# Patient Record
Sex: MALE | Race: WHITE | NOT HISPANIC OR LATINO | URBAN - METROPOLITAN AREA
[De-identification: names, ages, dates, MRNs, and addresses within clinical notes are randomized per-mention and may not be internally consistent; named-entity substitution may affect disease eponyms.]

---

## 2017-08-11 ENCOUNTER — HISTORICAL (OUTPATIENT)
Dept: ADMINISTRATIVE | Facility: HOSPITAL | Age: 47
End: 2017-08-11

## 2017-08-13 LAB — FINAL CULTURE: NORMAL

## 2017-08-18 LAB
FINAL CULTURE: NORMAL
FINAL CULTURE: NORMAL

## 2021-06-08 ENCOUNTER — HISTORICAL (OUTPATIENT)
Dept: ADMINISTRATIVE | Facility: HOSPITAL | Age: 51
End: 2021-06-08

## 2021-06-10 LAB — FINAL CULTURE: NORMAL

## 2021-10-18 ENCOUNTER — HISTORICAL (OUTPATIENT)
Dept: ADMINISTRATIVE | Facility: HOSPITAL | Age: 51
End: 2021-10-18

## 2021-10-18 LAB — POC CREATININE: 1.1 MG/DL (ref 0.6–1.3)

## 2022-05-20 DIAGNOSIS — K21.9 ESOPHAGEAL REFLUX: Primary | ICD-10-CM

## 2022-07-15 DIAGNOSIS — K21.9 ESOPHAGEAL REFLUX: Primary | ICD-10-CM

## 2022-07-21 ENCOUNTER — ANESTHESIA EVENT (OUTPATIENT)
Dept: ENDOSCOPY | Facility: HOSPITAL | Age: 52
End: 2022-07-21
Payer: COMMERCIAL

## 2022-07-22 RX ORDER — FAMOTIDINE 40 MG/1
40 TABLET, FILM COATED ORAL DAILY
COMMUNITY

## 2022-07-22 RX ORDER — PANTOPRAZOLE SODIUM 40 MG/1
40 TABLET, DELAYED RELEASE ORAL DAILY
COMMUNITY

## 2022-07-22 RX ORDER — ESOMEPRAZOLE MAGNESIUM 10 MG/1
10 GRANULE, FOR SUSPENSION, EXTENDED RELEASE ORAL
COMMUNITY

## 2022-07-22 RX ORDER — GLYCOPYRROLATE 1 MG/1
1 TABLET ORAL DAILY PRN
COMMUNITY

## 2022-07-22 RX ORDER — SILDENAFIL CITRATE 20 MG/1
20 TABLET ORAL DAILY PRN
COMMUNITY

## 2022-07-25 ENCOUNTER — ANESTHESIA (OUTPATIENT)
Dept: ENDOSCOPY | Facility: HOSPITAL | Age: 52
End: 2022-07-25
Payer: COMMERCIAL

## 2022-07-25 ENCOUNTER — HOSPITAL ENCOUNTER (OUTPATIENT)
Facility: HOSPITAL | Age: 52
Discharge: HOME | End: 2022-07-25
Attending: INTERNAL MEDICINE | Admitting: INTERNAL MEDICINE
Payer: COMMERCIAL

## 2022-07-25 VITALS
BODY MASS INDEX: 29.62 KG/M2 | SYSTOLIC BLOOD PRESSURE: 122 MMHG | DIASTOLIC BLOOD PRESSURE: 78 MMHG | OXYGEN SATURATION: 95 % | HEIGHT: 69 IN | HEART RATE: 66 BPM | WEIGHT: 200 LBS | RESPIRATION RATE: 24 BRPM

## 2022-07-25 DIAGNOSIS — K21.9 GASTROESOPHAGEAL REFLUX DISEASE WITHOUT ESOPHAGITIS: Primary | ICD-10-CM

## 2022-07-25 DIAGNOSIS — K21.9 NONEROSIVE ESOPHAGEAL REFLUX DISEASE: ICD-10-CM

## 2022-07-25 DIAGNOSIS — K21.9 GASTROESOPHAGEAL REFLUX DISEASE, UNSPECIFIED WHETHER ESOPHAGITIS PRESENT: ICD-10-CM

## 2022-07-25 DIAGNOSIS — K58.9 IRRITABLE BOWEL SYNDROME, UNSPECIFIED TYPE: ICD-10-CM

## 2022-07-25 PROCEDURE — 37000008 HC ANESTHESIA 1ST 15 MINUTES: Performed by: INTERNAL MEDICINE

## 2022-07-25 PROCEDURE — 27200942: Performed by: INTERNAL MEDICINE

## 2022-07-25 PROCEDURE — 43450 DILATE ESOPHAGUS 1/MULT PASS: CPT | Performed by: INTERNAL MEDICINE

## 2022-07-25 PROCEDURE — 25000003 PHARM REV CODE 250: Performed by: NURSE ANESTHETIST, CERTIFIED REGISTERED

## 2022-07-25 PROCEDURE — 37000009 HC ANESTHESIA EA ADD 15 MINS: Performed by: INTERNAL MEDICINE

## 2022-07-25 PROCEDURE — 27201423 OPTIME MED/SURG SUP & DEVICES STERILE SUPPLY: Performed by: INTERNAL MEDICINE

## 2022-07-25 PROCEDURE — 43239 EGD BIOPSY SINGLE/MULTIPLE: CPT | Mod: 59 | Performed by: INTERNAL MEDICINE

## 2022-07-25 PROCEDURE — 63600175 PHARM REV CODE 636 W HCPCS: Performed by: NURSE ANESTHETIST, CERTIFIED REGISTERED

## 2022-07-25 RX ORDER — LIDOCAINE HYDROCHLORIDE 20 MG/ML
INJECTION, SOLUTION EPIDURAL; INFILTRATION; INTRACAUDAL; PERINEURAL
Status: COMPLETED
Start: 2022-07-25 | End: 2022-07-25

## 2022-07-25 RX ORDER — LIDOCAINE HYDROCHLORIDE 20 MG/ML
INJECTION, SOLUTION EPIDURAL; INFILTRATION; INTRACAUDAL; PERINEURAL
Status: DISCONTINUED
Start: 2022-07-25 | End: 2022-07-25 | Stop reason: HOSPADM

## 2022-07-25 RX ORDER — KETAMINE HYDROCHLORIDE 100 MG/ML
INJECTION, SOLUTION INTRAMUSCULAR; INTRAVENOUS
Status: DISCONTINUED | OUTPATIENT
Start: 2022-07-25 | End: 2022-07-25

## 2022-07-25 RX ORDER — SODIUM CHLORIDE 0.9 % (FLUSH) 0.9 %
10 SYRINGE (ML) INJECTION
Status: DISCONTINUED | OUTPATIENT
Start: 2022-07-25 | End: 2022-07-25 | Stop reason: HOSPADM

## 2022-07-25 RX ORDER — KETAMINE HYDROCHLORIDE 100 MG/ML
INJECTION, SOLUTION INTRAMUSCULAR; INTRAVENOUS
Status: COMPLETED
Start: 2022-07-25 | End: 2022-07-25

## 2022-07-25 RX ORDER — PROPOFOL 10 MG/ML
VIAL (ML) INTRAVENOUS
Status: DISCONTINUED | OUTPATIENT
Start: 2022-07-25 | End: 2022-07-25

## 2022-07-25 RX ORDER — PROPOFOL 10 MG/ML
VIAL (ML) INTRAVENOUS CONTINUOUS PRN
Status: DISCONTINUED | OUTPATIENT
Start: 2022-07-25 | End: 2022-07-25

## 2022-07-25 RX ORDER — LIDOCAINE HYDROCHLORIDE 20 MG/ML
INJECTION, SOLUTION EPIDURAL; INFILTRATION; INTRACAUDAL; PERINEURAL
Status: DISCONTINUED | OUTPATIENT
Start: 2022-07-25 | End: 2022-07-25

## 2022-07-25 RX ORDER — GLYCOPYRROLATE 0.2 MG/ML
INJECTION INTRAMUSCULAR; INTRAVENOUS
Status: COMPLETED
Start: 2022-07-25 | End: 2022-07-25

## 2022-07-25 RX ORDER — PROPOFOL 10 MG/ML
VIAL (ML) INTRAVENOUS
Status: COMPLETED
Start: 2022-07-25 | End: 2022-07-25

## 2022-07-25 RX ORDER — LIDOCAINE HYDROCHLORIDE 10 MG/ML
1 INJECTION, SOLUTION EPIDURAL; INFILTRATION; INTRACAUDAL; PERINEURAL ONCE
Status: DISCONTINUED | OUTPATIENT
Start: 2022-07-25 | End: 2022-07-25 | Stop reason: HOSPADM

## 2022-07-25 RX ORDER — SODIUM CHLORIDE, SODIUM GLUCONATE, SODIUM ACETATE, POTASSIUM CHLORIDE AND MAGNESIUM CHLORIDE 30; 37; 368; 526; 502 MG/100ML; MG/100ML; MG/100ML; MG/100ML; MG/100ML
1000 INJECTION, SOLUTION INTRAVENOUS CONTINUOUS
Status: DISCONTINUED | OUTPATIENT
Start: 2022-07-25 | End: 2022-07-25 | Stop reason: HOSPADM

## 2022-07-25 RX ORDER — PHENYLEPHRINE HYDROCHLORIDE 10 MG/ML
INJECTION INTRAVENOUS
Status: DISCONTINUED | OUTPATIENT
Start: 2022-07-25 | End: 2022-07-25

## 2022-07-25 RX ADMIN — SODIUM CHLORIDE, SODIUM GLUCONATE, SODIUM ACETATE, POTASSIUM CHLORIDE AND MAGNESIUM CHLORIDE: 526; 502; 368; 37; 30 INJECTION, SOLUTION INTRAVENOUS at 07:07

## 2022-07-25 RX ADMIN — PHENYLEPHRINE HYDROCHLORIDE 100 MCG: 10 INJECTION INTRAVENOUS at 08:07

## 2022-07-25 RX ADMIN — GLYCOPYRROLATE 0.2 MG: 0.2 INJECTION, SOLUTION INTRAMUSCULAR; INTRAVENOUS at 07:07

## 2022-07-25 RX ADMIN — PROPOFOL 50 MG: 10 INJECTION, EMULSION INTRAVENOUS at 08:07

## 2022-07-25 RX ADMIN — LIDOCAINE HYDROCHLORIDE 4 ML: 20 INJECTION, SOLUTION INTRAVENOUS at 07:07

## 2022-07-25 RX ADMIN — PROPOFOL 50 MG: 10 INJECTION, EMULSION INTRAVENOUS at 07:07

## 2022-07-25 RX ADMIN — KETAMINE HYDROCHLORIDE 20 MG: 100 INJECTION, SOLUTION, CONCENTRATE INTRAMUSCULAR; INTRAVENOUS at 07:07

## 2022-07-25 NOTE — ANESTHESIA POSTPROCEDURE EVALUATION
Anesthesia Post Evaluation    Patient: Chava Bah    Procedure(s) Performed: Procedure(s) (LRB):  EGD w/ BRAVO PLACEMENT 48 HR (N/A)  BRAVO (N/A)  ESOPHAGOSCOPY, USING BOUGIE, WITH DILATION (N/A)  EGD, WITH CLOSED BIOPSY (N/A)    Final Anesthesia Type: MAC      Patient location during evaluation: PACU  Patient participation: Yes- Able to Participate  Level of consciousness: awake and alert  Post-procedure vital signs: reviewed and stable  Pain management: adequate  Airway patency: patent    PONV status at discharge: No PONV  Anesthetic complications: no      Cardiovascular status: blood pressure returned to baseline  Respiratory status: spontaneous ventilation and unassisted  Hydration status: euvolemic  Follow-up needed           Vitals Value Taken Time   /78 07/25/22 0840   Temp 98 07/25/22 1342   Pulse 66 07/25/22 0840   Resp 24 07/25/22 0840   SpO2 95 % 07/25/22 0840         No case tracking events are documented in the log.      Pain/Tyree Score: Tyree Score: 10 (7/25/2022  8:40 AM)

## 2022-07-25 NOTE — PROVATION PATIENT INSTRUCTIONS
Discharge Summary/Instructions after an Endoscopic Procedure  Patient Name: Chava Bah  Patient MRN: [id number]  Patient YOB: 1970 Monday, July 25, 2022  BRENDA Schreiber MD  Dear patient,  As a result of recent federal legislation (The Federal Cures Act), you may   receive lab or pathology results from your procedure in your [facility]   account before your physician is able to contact you. Your physician or   their representative will relay the results to you with their   recommendations at their soonest availability.  Thank you,  RESTRICTIONS:  During your procedure today, you received medications for sedation.  These   medications may affect your judgment, balance and coordination.  Therefore,   for 24 hours, you have the following restrictions:   - DO NOT drive a car, operate machinery, make legal/financial decisions,   sign important papers or drink alcohol.    ACTIVITY:  Today: no heavy lifting, straining or running due to procedural   sedation/anesthesia.  The following day: return to full activity including work.  DIET:  Eat and drink normally unless instructed otherwise.     TREATMENT FOR COMMON SIDE EFFECTS:  - Mild abdominal pain, nausea, belching, bloating or excessive gas:  rest,   eat lightly and use a heating pad.  - Sore Throat: treat with throat lozenges and/or gargle with warm salt   water.  - Because air was used during the procedure, expelling large amounts of air   from your rectum or belching is normal.  - If a bowel prep was taken, you may not have a bowel movement for 1-3 days.    This is normal.  SYMPTOMS TO WATCH FOR AND REPORT TO YOUR PHYSICIAN:  1. Abdominal pain or bloating, other than gas cramps.  2. Chest pain.  3. Back pain.  4. Signs of infection such as: chills or fever occurring within 24 hours   after the procedure.  5. Rectal bleeding, which would show as bright red, maroon, or black stools.   (A tablespoon of blood from the rectum is not serious, especially  if   hemorrhoids are present.)  6. Vomiting.  7. Weakness or dizziness.  GO DIRECTLY TO THE NEAREST EMERGENCY ROOM IF YOU HAVE ANY OF THE FOLLOWING:      Difficulty breathing              Chills and/or fever over 101 F   Persistent vomiting and/or vomiting blood   Severe abdominal pain   Severe chest pain   Black, tarry stools   Bleeding- more than one tablespoon   Any other symptom or condition that you feel may need urgent attention  Your doctor recommends these additional instructions:  If any biopsies were taken, your doctors clinic will contact you in 1 to 2   weeks with any results.  - Patient has a contact number available for emergencies.  The signs and   symptoms of potential delayed complications were discussed with the   patient.  Return to normal activities tomorrow.  Written discharge   instructions were provided to the patient.   - Written discharge instructions were provided to the patient.   - The signs and symptoms of potential delayed complications were discussed   with the patient.   - Patient has a contact number available for emergencies.   - Return to normal activities tomorrow.   - Resume previous diet.  For questions, problems or results please call your physician - BRENDA Schreiber MD at Work:  [phone].  [facility] [place], EMERGENCY ROOM PHONE NUMBER: [phone]  IF A COMPLICATION OR EMERGENCY SITUATION ARISES AND YOU ARE UNABLE TO REACH   YOUR PHYSICIAN - GO DIRECTLY TO THE EMERGENCY ROOM.  MD BRENDA Kirkland MD  7/25/2022 8:19:39 AM  This report has been verified and signed electronically.  Dear patient,  As a result of recent federal legislation (The Federal Cures Act), you may   receive lab or pathology results from your procedure in your [facility]   account before your physician is able to contact you. Your physician or   their representative will relay the results to you with their   recommendations at their soonest  availability.  Thank you,  PROVATION

## 2022-07-25 NOTE — INTERVAL H&P NOTE
The patient has been examined and the H&P has been reviewed. No changes in past 30 days.  risks, benefits and alternative options discussed and understood by patient/family.          There are no hospital problems to display for this patient.

## 2022-07-25 NOTE — TRANSFER OF CARE
"Anesthesia Transfer of Care Note    Patient: Chava Bah    Procedure(s) Performed: Procedure(s) (LRB):  EGD w/ BRAVO PLACEMENT 48 HR (N/A)  BRAVO (N/A)  ESOPHAGOSCOPY, USING BOUGIE, WITH DILATION (N/A)  EGD, WITH CLOSED BIOPSY (N/A)    Patient location: GI    Anesthesia Type: MAC    Transport from OR: Transported from OR on room air with adequate spontaneous ventilation    Post pain: adequate analgesia    Post assessment: no apparent anesthetic complications and tolerated procedure well    Post vital signs: stable    Level of consciousness: awake and alert    Nausea/Vomiting: no nausea/vomiting    Complications: none    Transfer of care protocol was followed      Last vitals:   Visit Vitals  Ht 5' 9" (1.753 m)   Wt 90.7 kg (200 lb)   BMI 29.53 kg/m²     "

## 2022-07-27 LAB
ESTROGEN SERPL-MCNC: NORMAL PG/ML
INSULIN SERPL-ACNC: NORMAL U[IU]/ML
LAB AP CLINICAL INFORMATION: NORMAL
LAB AP GROSS DESCRIPTION: NORMAL
LAB AP REPORT FOOTNOTES: NORMAL
T3RU NFR SERPL: NORMAL %

## 2023-04-18 ENCOUNTER — APPOINTMENT (RX ONLY)
Dept: URBAN - METROPOLITAN AREA CLINIC 110 | Facility: CLINIC | Age: 53
Setting detail: DERMATOLOGY
End: 2023-04-18

## 2023-04-18 VITALS — HEIGHT: 69 IN | WEIGHT: 230 LBS

## 2023-04-18 DIAGNOSIS — D18.0 HEMANGIOMA: ICD-10-CM

## 2023-04-18 DIAGNOSIS — L81.4 OTHER MELANIN HYPERPIGMENTATION: ICD-10-CM

## 2023-04-18 DIAGNOSIS — Z71.89 OTHER SPECIFIED COUNSELING: ICD-10-CM

## 2023-04-18 DIAGNOSIS — L82.0 INFLAMED SEBORRHEIC KERATOSIS: ICD-10-CM

## 2023-04-18 DIAGNOSIS — L57.8 OTHER SKIN CHANGES DUE TO CHRONIC EXPOSURE TO NONIONIZING RADIATION: ICD-10-CM

## 2023-04-18 DIAGNOSIS — D22 MELANOCYTIC NEVI: ICD-10-CM

## 2023-04-18 DIAGNOSIS — L82.1 OTHER SEBORRHEIC KERATOSIS: ICD-10-CM

## 2023-04-18 PROBLEM — D18.01 HEMANGIOMA OF SKIN AND SUBCUTANEOUS TISSUE: Status: ACTIVE | Noted: 2023-04-18

## 2023-04-18 PROBLEM — D22.5 MELANOCYTIC NEVI OF TRUNK: Status: ACTIVE | Noted: 2023-04-18

## 2023-04-18 PROCEDURE — 99213 OFFICE O/P EST LOW 20 MIN: CPT | Mod: 25

## 2023-04-18 PROCEDURE — 17110 DESTRUCTION B9 LES UP TO 14: CPT

## 2023-04-18 PROCEDURE — ? COUNSELING

## 2023-04-18 PROCEDURE — ? LIQUID NITROGEN

## 2023-04-18 ASSESSMENT — LOCATION SIMPLE DESCRIPTION DERM
LOCATION SIMPLE: CHEST
LOCATION SIMPLE: LEFT UPPER BACK
LOCATION SIMPLE: RIGHT UPPER BACK

## 2023-04-18 ASSESSMENT — LOCATION DETAILED DESCRIPTION DERM
LOCATION DETAILED: RIGHT MID-UPPER BACK
LOCATION DETAILED: LEFT SUPERIOR MEDIAL UPPER BACK
LOCATION DETAILED: LEFT SUPERIOR UPPER BACK
LOCATION DETAILED: RIGHT MEDIAL INFERIOR CHEST
LOCATION DETAILED: STERNUM
LOCATION DETAILED: RIGHT INFERIOR UPPER BACK
LOCATION DETAILED: RIGHT MEDIAL SUPERIOR CHEST
LOCATION DETAILED: LEFT MEDIAL SUPERIOR CHEST
LOCATION DETAILED: LEFT MEDIAL UPPER BACK

## 2023-04-18 ASSESSMENT — LOCATION ZONE DERM: LOCATION ZONE: TRUNK

## 2023-04-18 NOTE — PROCEDURE: LIQUID NITROGEN
Add 52 Modifier (Optional): no
Show Aperture Variable?: Yes
Spray Paint Text: The liquid nitrogen was applied to the skin utilizing a spray paint frosting technique.
Medical Necessity Clause: This procedure was medically necessary because the lesions that were treated were:
Post-Care Instructions: I reviewed with the patient in detail post-care instructions. Patient is to wear sunprotection, and avoid picking at any of the treated lesions. Pt may apply Vaseline to crusted or scabbing areas.
Medical Necessity Information: It is in your best interest to select a reason for this procedure from the list below. All of these items fulfill various CMS LCD requirements except the new and changing color options.
Consent: The patient's consent was obtained including but not limited to risks of crusting, scabbing, blistering, scarring, darker or lighter pigmentary change, recurrence, incomplete removal and infection.
Detail Level: Detailed

## 2023-04-28 ENCOUNTER — HOSPITAL ENCOUNTER (OUTPATIENT)
Dept: RADIOLOGY | Facility: CLINIC | Age: 53
Discharge: HOME | End: 2023-04-28
Attending: PHYSICIAN ASSISTANT
Payer: COMMERCIAL

## 2023-04-28 ENCOUNTER — OFFICE VISIT (OUTPATIENT)
Dept: ORTHOPEDICS | Facility: CLINIC | Age: 53
End: 2023-04-28
Payer: COMMERCIAL

## 2023-04-28 VITALS
DIASTOLIC BLOOD PRESSURE: 87 MMHG | WEIGHT: 187 LBS | BODY MASS INDEX: 27.7 KG/M2 | HEIGHT: 69 IN | HEART RATE: 75 BPM | SYSTOLIC BLOOD PRESSURE: 143 MMHG

## 2023-04-28 DIAGNOSIS — M25.512 LEFT SHOULDER PAIN, UNSPECIFIED CHRONICITY: ICD-10-CM

## 2023-04-28 DIAGNOSIS — M75.82 ROTATOR CUFF TENDONITIS, LEFT: ICD-10-CM

## 2023-04-28 DIAGNOSIS — M19.012 OSTEOARTHRITIS OF LEFT AC (ACROMIOCLAVICULAR) JOINT: ICD-10-CM

## 2023-04-28 DIAGNOSIS — M25.512 LEFT SHOULDER PAIN, UNSPECIFIED CHRONICITY: Primary | ICD-10-CM

## 2023-04-28 PROCEDURE — 3008F PR BODY MASS INDEX (BMI) DOCUMENTED: ICD-10-PCS | Mod: CPTII,,, | Performed by: PHYSICIAN ASSISTANT

## 2023-04-28 PROCEDURE — 3079F DIAST BP 80-89 MM HG: CPT | Mod: CPTII,,, | Performed by: PHYSICIAN ASSISTANT

## 2023-04-28 PROCEDURE — 73030 X-RAY EXAM OF SHOULDER: CPT | Mod: LT,,, | Performed by: PHYSICIAN ASSISTANT

## 2023-04-28 PROCEDURE — 3079F PR MOST RECENT DIASTOLIC BLOOD PRESSURE 80-89 MM HG: ICD-10-PCS | Mod: CPTII,,, | Performed by: PHYSICIAN ASSISTANT

## 2023-04-28 PROCEDURE — 3077F SYST BP >= 140 MM HG: CPT | Mod: CPTII,,, | Performed by: PHYSICIAN ASSISTANT

## 2023-04-28 PROCEDURE — 1160F PR REVIEW ALL MEDS BY PRESCRIBER/CLIN PHARMACIST DOCUMENTED: ICD-10-PCS | Mod: CPTII,,, | Performed by: PHYSICIAN ASSISTANT

## 2023-04-28 PROCEDURE — 3008F BODY MASS INDEX DOCD: CPT | Mod: CPTII,,, | Performed by: PHYSICIAN ASSISTANT

## 2023-04-28 PROCEDURE — 1160F RVW MEDS BY RX/DR IN RCRD: CPT | Mod: CPTII,,, | Performed by: PHYSICIAN ASSISTANT

## 2023-04-28 PROCEDURE — 73030 XR SHOULDER COMPLETE 2 OR MORE VIEWS LEFT: ICD-10-PCS | Mod: LT,,, | Performed by: PHYSICIAN ASSISTANT

## 2023-04-28 PROCEDURE — 99202 PR OFFICE/OUTPT VISIT, NEW, LEVL II, 15-29 MIN: ICD-10-PCS | Mod: ,,, | Performed by: PHYSICIAN ASSISTANT

## 2023-04-28 PROCEDURE — 3077F PR MOST RECENT SYSTOLIC BLOOD PRESSURE >= 140 MM HG: ICD-10-PCS | Mod: CPTII,,, | Performed by: PHYSICIAN ASSISTANT

## 2023-04-28 PROCEDURE — 1159F MED LIST DOCD IN RCRD: CPT | Mod: CPTII,,, | Performed by: PHYSICIAN ASSISTANT

## 2023-04-28 PROCEDURE — 99202 OFFICE O/P NEW SF 15 MIN: CPT | Mod: ,,, | Performed by: PHYSICIAN ASSISTANT

## 2023-04-28 PROCEDURE — 1159F PR MEDICATION LIST DOCUMENTED IN MEDICAL RECORD: ICD-10-PCS | Mod: CPTII,,, | Performed by: PHYSICIAN ASSISTANT

## 2023-04-28 NOTE — PROGRESS NOTES
Chief Complaint:   Chief Complaint   Patient presents with    Left Shoulder - Pain    Shoulder Pain     Pt states he was trying to take his shirt off and heard a click/crack sound in his shoulder. Pt states the pain has gotten better, but he would like to check what happened. Pt wanted to report that he did undergo through a partial nephrectomy and can't take NSAIDs.       History of present illness:    53-year-old right-hand dominant male presents office today for evaluation of his left shoulder pain and popping.  This has been present for couple weeks with no associated injury.  He was taking off his shirt and noted some popping sensations to the left shoulder with some resultant lateral-sided pain.  The pain has gotten better since he made the appointment.  He is also recovering from shingles 3 weeks ago and currently still on Valtrex.  He just wanted to have this evaluated and make sure that the popping sensations were not abnormal    Past Medical History:   Diagnosis Date    Bone spur neck     Congenital absence of one kidney     E. coli after prostate bx, currently on abx     GERD (gastroesophageal reflux disease)     History of chicken pox     Irregular heart beat     Irritable bowel syndrome without diarrhea     Kidney stones     Nonerosive esophageal reflux disease     Personal history of colonic polyps     Personal history of colonic polyps     Prostatitis     Swallowing difficulty        Past Surgical History:   Procedure Laterality Date    abdominal ultrasound      COLONOSCOPY  04/20/2022    COLONOSCOPY  03/30/2017    ESOPHAGOGASTRODUODENOSCOPY      2021, 02/25/21, 12/29/17,    ESOPHAGOGASTRODUODENOSCOPY N/A 07/25/2022    Procedure: EGD w/ BRAVO PLACEMENT 48 HR;  Surgeon: SHELTON Schreiber MD;  Location: [facility] ENDOSCOPY;  Service: Gastroenterology;  Laterality: N/A;  Pt advised by office to hold PPI x 3 days before procedure.    INSERTION OF PH PROBE N/A 07/25/2022    Procedure: BRAVO;   "Surgeon: SHELTON Schreiber MD;  Location: [facility] ENDOSCOPY;  Service: Gastroenterology;  Laterality: N/A;    LITHOTRIPSY      x2    PARTIAL NEPHRECTOMY      PROSTATE BIOPSY      x2       Current Outpatient Medications   Medication Sig    ergocalciferol, vitamin D2, (VITAMIN D ORAL) Take by mouth.    esomeprazole magnesium (NEXIUM) 10 mg suspension Take 10 mg by mouth before breakfast.    famotidine (PEPCID) 40 MG tablet Take 40 mg by mouth once daily.    glycopyrrolate (ROBINUL) 1 mg Tab Take 1 mg by mouth daily as needed.    pantoprazole (PROTONIX) 40 MG tablet Take 40 mg by mouth once daily.    sildenafil (REVATIO) 20 mg Tab Take 20 mg by mouth daily as needed.     No current facility-administered medications for this visit.       Review of patient's allergies indicates:   Allergen Reactions    Penicillins        Family History   Family history unknown: Yes       Social History     Socioeconomic History    Marital status: Unknown   Tobacco Use    Smoking status: Never    Smokeless tobacco: Never   Substance and Sexual Activity    Alcohol use: Yes    Drug use: Never    Sexual activity: Yes     Partners: Female         Review of Systems:    Constitution:   Denies chills, fever, and sweats.  HENT:   Denies headaches or blurry vision.  Cardiovascular:  Denies chest pain or irregular heart beat.  Respiratory:   Denies cough or shortness of breath.  Gastrointestinal:  Denies abdominal pain, nausea, or vomiting.  Musculoskeletal:   Denies muscle cramps.  Neurological:   Denies dizziness or focal weakness.  Psychiatric/Behavior: Normal mental status.  Hematology/Lymph:  Denies bleeding problem or easy bruising/bleeding.  Skin:    Denies rash or suspicious lesions.    Examination:    Vital Signs:    Vitals:    04/28/23 1015 04/28/23 1024   BP: (!) 143/87    Pulse: 75    Weight: 84.8 kg (187 lb)    Height: 5' 9" (1.753 m)    PainSc:    1       Body mass index is 27.62 kg/m².    Constitution:   Well-developed, well " nourished patient in no acute distress.  Neurological:   Alert and oriented x 3 and cooperative to examination.     Psychiatric/Behavior: Normal mental status.  Respiratory:   No shortness of breath.  Nonlabored breathing  Cardiovascular:           Regular rate and rhythm  Eyes:    Extraoccular muscles intact  Skin:    No scars, rash or suspicious lesions.    Physical Exam:     left Shoulder:     No swelling, erythema or increased heat    Tender over deltoid, supraspinatus and infraspinatus  Mild tenderness over the AC joint    No tenderness over bicipital groove    negative drop arm test Positive Neer and Aguayo impingement signs    No weakness with rotator cuff resistance     Active shoulder abduction 180  Active forward elevation  180  Active internal rotation 70   Active external rotation 80     Radiographs of the left shoulder, four views, taken in the office today show well-maintained glenohumeral joint space.  He does have some degeneration seen through the AC joint.        Assessment:     Left shoulder pain, unspecified chronicity  -     X-Ray Shoulder 2 or More Views Left; Future; Expected date: 04/28/2023    Rotator cuff tendonitis, left    Osteoarthritis of left AC (acromioclavicular) joint        Plan:      I have discussed exam and x-ray findings with the patient today.  I do feel that he may have strained his rotator cuff but his pain has improved.  His strength is intact.  He does have some crepitance noted with range of motion coming from his AC joint.  He can not tolerate NSAIDs with history of partial nephrectomy.  I have discussed with him that if his symptoms worsen then we could do a subacromial corticosteroid injection with a round of physical therapy.  He will follow up with us as needed        No follow-ups on file.    DISCLAIMER: This note may have been dictated using voice recognition software and may contain grammatical errors.

## 2023-05-08 ENCOUNTER — HOSPITAL ENCOUNTER (OUTPATIENT)
Dept: RADIOLOGY | Facility: CLINIC | Age: 53
Discharge: HOME | End: 2023-05-08
Attending: ORTHOPAEDIC SURGERY
Payer: COMMERCIAL

## 2023-05-08 ENCOUNTER — OFFICE VISIT (OUTPATIENT)
Dept: ORTHOPEDICS | Facility: CLINIC | Age: 53
End: 2023-05-08
Payer: COMMERCIAL

## 2023-05-08 DIAGNOSIS — M25.562 CHRONIC PAIN OF BOTH KNEES: ICD-10-CM

## 2023-05-08 DIAGNOSIS — G89.29 CHRONIC PAIN OF BOTH KNEES: ICD-10-CM

## 2023-05-08 DIAGNOSIS — S83.232A COMPLEX TEAR OF MEDIAL MENISCUS OF LEFT KNEE AS CURRENT INJURY, INITIAL ENCOUNTER: Primary | ICD-10-CM

## 2023-05-08 DIAGNOSIS — Z98.890 S/P ACL RECONSTRUCTION: ICD-10-CM

## 2023-05-08 DIAGNOSIS — M25.561 CHRONIC PAIN OF BOTH KNEES: ICD-10-CM

## 2023-05-08 PROCEDURE — 99204 PR OFFICE/OUTPT VISIT, NEW, LEVL IV, 45-59 MIN: ICD-10-PCS | Mod: ,,, | Performed by: ORTHOPAEDIC SURGERY

## 2023-05-08 PROCEDURE — 73564 X-RAY EXAM KNEE 4 OR MORE: CPT | Mod: LT,,, | Performed by: ORTHOPAEDIC SURGERY

## 2023-05-08 PROCEDURE — 99204 OFFICE O/P NEW MOD 45 MIN: CPT | Mod: ,,, | Performed by: ORTHOPAEDIC SURGERY

## 2023-05-08 PROCEDURE — 73564 XR KNEE COMP 4 OR MORE VIEWS BILAT: ICD-10-PCS | Mod: RT,,, | Performed by: ORTHOPAEDIC SURGERY

## 2023-05-08 PROCEDURE — 73564 X-RAY EXAM KNEE 4 OR MORE: CPT | Mod: RT,,, | Performed by: ORTHOPAEDIC SURGERY

## 2023-05-08 NOTE — PROGRESS NOTES
Chief Complaint:   Chief Complaint   Patient presents with    Right Knee - Pain     Pain has been going on for 6 mths. has not been taking anything for pain. Pain is a 6/10. Has tried kenalog injection with no relief. Has a MRI scheduled on his right knee tomorrow.      Left Knee - Pain     7mth sp left knee ACL sx 10/26/22 done by rebecca stover MD. Pain is a 2/10 8/10. has not been taking anything for pain. Patient has completed PT. Cant bend without pain. States he re injured his knee while in therapy 4 days after his last MRI.       Consulting Physician: No ref. provider found    History of present illness:    he is a pleasant 53 y.o. year old male who previously underwent a left ACL reconstruction with hamstring autograft and medial meniscus repair in October of 2022.  He is had some persistent pain and weakness of the leg since then.  He did proximally 3 months of formal physical therapy.  It sounds like he was pretty active even initially with therapy with some lunging and jumping in the acute postoperative phase.  He has tried anti-inflammatory medicines without relief.  He does notice some numbness along the anterior aspect of his tibia.  The pain is mostly located along the medial side.  He occasionally has popping or crunching within the knee.  He is noticed weakness particularly with going up and down stairs and prolonged ambulation as well as raising from a seated position.  He is difficulty in getting into vehicles.  He had a repeat MRI of the knee in April of 2023 and then 4 days following this had a recurrent injury while doing squats.Today he complains of pain along the medial side of the knee.  He does have persistent weakness.  He notices popping along the lateral side of the knee.    He also complains of right knee pain.  The pain on the right knee is located along the medial side of the knee.  It has been ongoing for about 6 months or so.  He twisted the knee when getting out of the car.  He is  tried an injection without relief.  He has an MRI scheduled on 5/9/23.    Past Medical History:   Diagnosis Date    Bone spur neck     Congenital absence of one kidney     E. coli after prostate bx, currently on abx     GERD (gastroesophageal reflux disease)     History of chicken pox     Irregular heart beat     Irritable bowel syndrome without diarrhea     Kidney stones     Nonerosive esophageal reflux disease     Personal history of colonic polyps     Personal history of colonic polyps     Prostatitis     Swallowing difficulty        Past Surgical History:   Procedure Laterality Date    abdominal ultrasound      COLONOSCOPY  04/20/2022    COLONOSCOPY  03/30/2017    ESOPHAGOGASTRODUODENOSCOPY      2021, 02/25/21, 12/29/17,    ESOPHAGOGASTRODUODENOSCOPY N/A 07/25/2022    Procedure: EGD w/ BRAVO PLACEMENT 48 HR;  Surgeon: SHELTON Schreiber MD;  Location: [facility] ENDOSCOPY;  Service: Gastroenterology;  Laterality: N/A;  Pt advised by office to hold PPI x 3 days before procedure.    INSERTION OF PH PROBE N/A 07/25/2022    Procedure: FAM;  Surgeon: SHELTON Schreiber MD;  Location: [facility] ENDOSCOPY;  Service: Gastroenterology;  Laterality: N/A;    LITHOTRIPSY      x2    PARTIAL NEPHRECTOMY      PROSTATE BIOPSY      x2       Current Outpatient Medications   Medication Sig    ergocalciferol, vitamin D2, (VITAMIN D ORAL) Take by mouth.    esomeprazole magnesium (NEXIUM) 10 mg suspension Take 10 mg by mouth before breakfast.    famotidine (PEPCID) 40 MG tablet Take 40 mg by mouth once daily.    glycopyrrolate (ROBINUL) 1 mg Tab Take 1 mg by mouth daily as needed.    pantoprazole (PROTONIX) 40 MG tablet Take 40 mg by mouth once daily.    sildenafil (REVATIO) 20 mg Tab Take 20 mg by mouth daily as needed.     No current facility-administered medications for this visit.       Review of patient's allergies indicates:   Allergen Reactions    Penicillins        Family History   Family history unknown: Yes        Social History     Socioeconomic History    Marital status: Unknown   Tobacco Use    Smoking status: Never    Smokeless tobacco: Never   Substance and Sexual Activity    Alcohol use: Yes    Drug use: Never    Sexual activity: Yes     Partners: Female       Review of Systems:    Constitution:   Denies chills, fever, and sweats.  HENT:   Denies headaches or blurry vision.  Cardiovascular:  Denies chest pain or irregular heart beat.  Respiratory:   Denies cough or shortness of breath.  Gastrointestinal:  Denies abdominal pain, nausea, or vomiting.  Musculoskeletal:   Denies muscle cramps.  Neurological:   Denies dizziness or focal weakness.  Psychiatric/Behavior: Normal mental status.  Hematology/Lymph:  Denies bleeding problem or easy bruising/bleeding.  Skin:    Denies rash or suspicious lesions.    Examination:    Vital Signs:    Vitals:    05/08/23 0838   PainSc:   8       There is no height or weight on file to calculate BMI.    Constitution:   Well-developed, well nourished patient in no acute distress.  Neurological:   Alert and oriented x 3 and cooperative to examination.     Psychiatric/Behavior: Normal mental status.  Respiratory:   No shortness of breath.  Cards:   Pulses palpable, brisk cap refill  Eyes:    Extraoccular muscles intact  Skin:    No scars, rash or suspicious lesions.    MSK:   Standing exam  stance: normal alignment, no significant leg-length discrepancy  gait: quad avoidance    Knee examination  - General comments:  Healed left knee incisions with quad atrophy.  - Tenderness:medial joint line    Knee                  RIGHT    LEFT  Skin:                  Intact      Intact  ROM:                 0-130      0-120  Effusion:             Neg         trace  MJL TTP:            +         +  LJL TTP:            Neg         Neg  Kam:          +          +  Pat crep:            Neg         +  Patella TTPs:     Neg         Neg  Patella grind:      Neg        Neg  Lachman:           Neg         Neg  Pivot shift:          Neg        Neg  Valgus stress:    Neg        Neg  Varus stress:      Neg        Neg  Posterior drawer: Neg       Neg    N-V intact intact  Hip: nml nml    Lower extremity edema:Negative     Imaging: X-rays ordered and images interpreted today personally by me of three views of bilateral knees show appropriate postoperative changes of the left knee.  Has no arthritis of the right knee.       Assessment: Complex tear of medial meniscus of left knee as current injury, initial encounter  -     MRI Knee Without Contrast Left; Future; Expected date: 05/08/2023    S/P ACL reconstruction  -     X-Ray Knee Complete 4 Or More Views Bilat; Future; Expected date: 05/08/2023        Plan:  We are going to repeat the MRI of the left knee.  Will also set him up for ACL testing to try to quantify his quad weakness.  He already has an MRI scheduled in the right knee.  I will see him back in a couple weeks to review all.  On the left side could be a candidate for a steroid injection, PRP injection, or repeat arthroscopic intervention

## 2023-05-24 ENCOUNTER — OFFICE VISIT (OUTPATIENT)
Dept: ORTHOPEDICS | Facility: CLINIC | Age: 53
End: 2023-05-24
Payer: COMMERCIAL

## 2023-05-24 DIAGNOSIS — M22.41 CHONDROMALACIA OF RIGHT PATELLA: ICD-10-CM

## 2023-05-24 DIAGNOSIS — Z98.890 S/P ACL RECONSTRUCTION: Primary | ICD-10-CM

## 2023-05-24 DIAGNOSIS — M62.81 QUADRICEPS WEAKNESS: ICD-10-CM

## 2023-05-24 PROCEDURE — 1159F MED LIST DOCD IN RCRD: CPT | Mod: CPTII,,, | Performed by: ORTHOPAEDIC SURGERY

## 2023-05-24 PROCEDURE — 99214 PR OFFICE/OUTPT VISIT, EST, LEVL IV, 30-39 MIN: ICD-10-PCS | Mod: ,,, | Performed by: ORTHOPAEDIC SURGERY

## 2023-05-24 PROCEDURE — 1159F PR MEDICATION LIST DOCUMENTED IN MEDICAL RECORD: ICD-10-PCS | Mod: CPTII,,, | Performed by: ORTHOPAEDIC SURGERY

## 2023-05-24 PROCEDURE — 99214 OFFICE O/P EST MOD 30 MIN: CPT | Mod: ,,, | Performed by: ORTHOPAEDIC SURGERY

## 2023-05-24 NOTE — PROGRESS NOTES
Chief Complaint:   Chief Complaint   Patient presents with    Results     right knee MRI results.        Consulting Physician: No ref. provider found    History of present illness:    he is a pleasant 53 y.o. year old male who previously underwent a left ACL reconstruction with hamstring autograft and medial meniscus repair in October of 2022.  He is had some persistent pain and weakness of the leg since then.  He did proximally 3 months of formal physical therapy.  It sounds like he was pretty active even initially with therapy with some lunging and jumping in the acute postoperative phase.  He has tried anti-inflammatory medicines without relief.  He does notice some numbness along the anterior aspect of his tibia.  The pain is mostly located along the medial side.  He occasionally has popping or crunching within the knee.  He is noticed weakness particularly with going up and down stairs and prolonged ambulation as well as raising from a seated position.  He is difficulty in getting into vehicles.  He had a repeat MRI of the knee in April of 2023 and then 4 days following this had a recurrent injury while doing squats.Today he complains of pain along the medial side of the knee.  He does have persistent weakness.  He notices popping along the lateral side of the knee.  He returns status post MRI and ACL testing.    He also complains of right knee pain.  The pain on the right knee is located along the medial side of the knee.  It has been ongoing for about 6 months or so.  He twisted the knee when getting out of the car.  He is tried an injection without relief.  He underwent a MRI in May of 2023.      Past Medical History:   Diagnosis Date    Bone spur neck     Congenital absence of one kidney     E. coli after prostate bx, currently on abx     GERD (gastroesophageal reflux disease)     History of chicken pox     Irregular heart beat     Irritable bowel syndrome without diarrhea     Kidney stones     Nonerosive  esophageal reflux disease     Personal history of colonic polyps     Personal history of colonic polyps     Prostatitis     Swallowing difficulty        Past Surgical History:   Procedure Laterality Date    abdominal ultrasound      COLONOSCOPY  04/20/2022    COLONOSCOPY  03/30/2017    ESOPHAGOGASTRODUODENOSCOPY      2021, 02/25/21, 12/29/17,    ESOPHAGOGASTRODUODENOSCOPY N/A 07/25/2022    Procedure: EGD w/ BRAVO PLACEMENT 48 HR;  Surgeon: SHELTON Schreiber MD;  Location: [facility] ENDOSCOPY;  Service: Gastroenterology;  Laterality: N/A;  Pt advised by office to hold PPI x 3 days before procedure.    INSERTION OF PH PROBE N/A 07/25/2022    Procedure: FAM;  Surgeon: SHELTON Schreiber MD;  Location: [facility] ENDOSCOPY;  Service: Gastroenterology;  Laterality: N/A;    LITHOTRIPSY      x2    PARTIAL NEPHRECTOMY      PROSTATE BIOPSY      x2       Current Outpatient Medications   Medication Sig    ascorbic acid, vitamin C, (VITAMIN C) 100 MG tablet Take 100 mg by mouth once daily.    ergocalciferol, vitamin D2, (VITAMIN D ORAL) Take by mouth.    esomeprazole magnesium (NEXIUM) 10 mg suspension Take 10 mg by mouth before breakfast.    famotidine (PEPCID) 40 MG tablet Take 40 mg by mouth once daily.    sildenafil (REVATIO) 20 mg Tab Take 20 mg by mouth daily as needed.    zinc sulfate (ZINC-15 ORAL) Take by mouth.    glycopyrrolate (ROBINUL) 1 mg Tab Take 1 mg by mouth daily as needed.    pantoprazole (PROTONIX) 40 MG tablet Take 40 mg by mouth once daily.     No current facility-administered medications for this visit.       Review of patient's allergies indicates:   Allergen Reactions    Penicillins        Family History   Problem Relation Age of Onset    No Known Problems Mother     No Known Problems Father        Social History     Socioeconomic History    Marital status: Unknown   Tobacco Use    Smoking status: Never    Smokeless tobacco: Never   Substance and Sexual Activity    Alcohol use: Yes    Drug  use: Never    Sexual activity: Yes     Partners: Female       Review of Systems:    Constitution:   Denies chills, fever, and sweats.  HENT:   Denies headaches or blurry vision.  Cardiovascular:  Denies chest pain or irregular heart beat.  Respiratory:   Denies cough or shortness of breath.  Gastrointestinal:  Denies abdominal pain, nausea, or vomiting.  Musculoskeletal:   Denies muscle cramps.  Neurological:   Denies dizziness or focal weakness.  Psychiatric/Behavior: Normal mental status.  Hematology/Lymph:  Denies bleeding problem or easy bruising/bleeding.  Skin:    Denies rash or suspicious lesions.    Examination:    Vital Signs:    There were no vitals filed for this visit.      There is no height or weight on file to calculate BMI.    Constitution:   Well-developed, well nourished patient in no acute distress.  Neurological:   Alert and oriented x 3 and cooperative to examination.     Psychiatric/Behavior: Normal mental status.  Respiratory:   No shortness of breath.  Cards:   Pulses palpable, brisk cap refill  Eyes:    Extraoccular muscles intact  Skin:    No scars, rash or suspicious lesions.    MSK:   Standing exam  stance: normal alignment, no significant leg-length discrepancy  gait: quad avoidance    Knee examination  - General comments:  Healed left knee incisions with quad atrophy.  - Tenderness:medial joint line    Knee                  RIGHT    LEFT  Skin:                  Intact      Intact  ROM:                 0-130      0-120  Effusion:             Neg         trace  MJL TTP:            +         +  LJL TTP:            Neg         Neg  Kam:          +          +  Pat crep:            Neg         +  Patella TTPs:     Neg         Neg  Patella grind:      Neg        Neg  Lachman:           Neg        Neg  Pivot shift:          Neg        Neg  Valgus stress:    Neg        Neg  Varus stress:      Neg        Neg  Posterior drawer: Neg       Neg    N-V intact intact  Hip: nml nml    Lower  extremity edema:Negative     Imaging: X-rays ordered and images interpreted today personally by me of three views of bilateral knees show appropriate postoperative changes of the left knee.  Has no arthritis of the right knee.       Assessment: S/P ACL reconstruction    Quadriceps weakness    Chondromalacia of right patella        Plan:  Repeat MRI shows maybe a small medial meniscus tear but the ACL graft intact on the left.  MRI on the right shows some mild chondromalacia of the patella.  We are going to get him back into physical therapy to help strengthen both knees.  His ACL testing on his left knee was poor.  I will see him back in 3 months for recheck.  We could also consider steroid, Synvisc, or PRP injections going forward.

## 2023-08-09 ENCOUNTER — OFFICE VISIT (OUTPATIENT)
Dept: ORTHOPEDICS | Facility: CLINIC | Age: 53
End: 2023-08-09
Payer: COMMERCIAL

## 2023-08-09 VITALS
BODY MASS INDEX: 28.29 KG/M2 | DIASTOLIC BLOOD PRESSURE: 86 MMHG | SYSTOLIC BLOOD PRESSURE: 138 MMHG | HEIGHT: 69 IN | HEART RATE: 70 BPM | WEIGHT: 191 LBS

## 2023-08-09 DIAGNOSIS — Z98.890 S/P ACL RECONSTRUCTION: Primary | ICD-10-CM

## 2023-08-09 DIAGNOSIS — M22.41 CHONDROMALACIA OF RIGHT PATELLA: ICD-10-CM

## 2023-08-09 PROCEDURE — 1159F MED LIST DOCD IN RCRD: CPT | Mod: CPTII,,, | Performed by: ORTHOPAEDIC SURGERY

## 2023-08-09 PROCEDURE — 3008F PR BODY MASS INDEX (BMI) DOCUMENTED: ICD-10-PCS | Mod: CPTII,,, | Performed by: ORTHOPAEDIC SURGERY

## 2023-08-09 PROCEDURE — 3079F DIAST BP 80-89 MM HG: CPT | Mod: CPTII,,, | Performed by: ORTHOPAEDIC SURGERY

## 2023-08-09 PROCEDURE — 99213 OFFICE O/P EST LOW 20 MIN: CPT | Mod: ,,, | Performed by: ORTHOPAEDIC SURGERY

## 2023-08-09 PROCEDURE — 99213 PR OFFICE/OUTPT VISIT, EST, LEVL III, 20-29 MIN: ICD-10-PCS | Mod: ,,, | Performed by: ORTHOPAEDIC SURGERY

## 2023-08-09 PROCEDURE — 3079F PR MOST RECENT DIASTOLIC BLOOD PRESSURE 80-89 MM HG: ICD-10-PCS | Mod: CPTII,,, | Performed by: ORTHOPAEDIC SURGERY

## 2023-08-09 PROCEDURE — 1159F PR MEDICATION LIST DOCUMENTED IN MEDICAL RECORD: ICD-10-PCS | Mod: CPTII,,, | Performed by: ORTHOPAEDIC SURGERY

## 2023-08-09 PROCEDURE — 3075F PR MOST RECENT SYSTOLIC BLOOD PRESS GE 130-139MM HG: ICD-10-PCS | Mod: CPTII,,, | Performed by: ORTHOPAEDIC SURGERY

## 2023-08-09 PROCEDURE — 3075F SYST BP GE 130 - 139MM HG: CPT | Mod: CPTII,,, | Performed by: ORTHOPAEDIC SURGERY

## 2023-08-09 PROCEDURE — 3008F BODY MASS INDEX DOCD: CPT | Mod: CPTII,,, | Performed by: ORTHOPAEDIC SURGERY

## 2023-08-09 RX ORDER — CELECOXIB 200 MG/1
CAPSULE ORAL DAILY PRN
COMMUNITY
Start: 2023-06-14

## 2023-08-09 RX ORDER — ESCITALOPRAM OXALATE 5 MG/1
5 TABLET ORAL
COMMUNITY
Start: 2023-08-08

## 2023-08-09 RX ORDER — CIPROFLOXACIN 500 MG/1
500 TABLET ORAL 2 TIMES DAILY
COMMUNITY
Start: 2023-07-05

## 2023-08-09 NOTE — LETTER
August 28, 2023       [facility]  [address], [address]  [place] [address]  Phone: [phone]  Fax: [phone]       Patient: Chava Bah   YOB: 1970  Date of Visit: 05/24/2023    To Whom It May Concern:    Aurora Bah  was at [facility] on 05/24/2023. Please excuse the patient from work until further notice. Patient has an appointment scheduled with us on 09/18/2023 and we will re evaluate him at that time. If you have any questions or concerns, or if I can be of further assistance, please do not hesitate to contact me.    Sincerely,    Dr Jj He MD

## 2023-09-18 ENCOUNTER — OFFICE VISIT (OUTPATIENT)
Dept: ORTHOPEDICS | Facility: CLINIC | Age: 53
End: 2023-09-18
Payer: COMMERCIAL

## 2023-09-18 VITALS
SYSTOLIC BLOOD PRESSURE: 149 MMHG | HEIGHT: 69 IN | DIASTOLIC BLOOD PRESSURE: 94 MMHG | BODY MASS INDEX: 28.29 KG/M2 | WEIGHT: 191 LBS | HEART RATE: 77 BPM

## 2023-09-18 DIAGNOSIS — M62.81 QUADRICEPS WEAKNESS: ICD-10-CM

## 2023-09-18 DIAGNOSIS — Z98.890 S/P ACL RECONSTRUCTION: Primary | ICD-10-CM

## 2023-09-18 PROCEDURE — 3080F DIAST BP >= 90 MM HG: CPT | Mod: CPTII,,, | Performed by: ORTHOPAEDIC SURGERY

## 2023-09-18 PROCEDURE — 3077F SYST BP >= 140 MM HG: CPT | Mod: CPTII,,, | Performed by: ORTHOPAEDIC SURGERY

## 2023-09-18 PROCEDURE — 1159F MED LIST DOCD IN RCRD: CPT | Mod: CPTII,,, | Performed by: ORTHOPAEDIC SURGERY

## 2023-09-18 PROCEDURE — 1159F PR MEDICATION LIST DOCUMENTED IN MEDICAL RECORD: ICD-10-PCS | Mod: CPTII,,, | Performed by: ORTHOPAEDIC SURGERY

## 2023-09-18 PROCEDURE — 3008F BODY MASS INDEX DOCD: CPT | Mod: CPTII,,, | Performed by: ORTHOPAEDIC SURGERY

## 2023-09-18 PROCEDURE — 99213 PR OFFICE/OUTPT VISIT, EST, LEVL III, 20-29 MIN: ICD-10-PCS | Mod: ,,, | Performed by: ORTHOPAEDIC SURGERY

## 2023-09-18 PROCEDURE — 3080F PR MOST RECENT DIASTOLIC BLOOD PRESSURE >= 90 MM HG: ICD-10-PCS | Mod: CPTII,,, | Performed by: ORTHOPAEDIC SURGERY

## 2023-09-18 PROCEDURE — 3077F PR MOST RECENT SYSTOLIC BLOOD PRESSURE >= 140 MM HG: ICD-10-PCS | Mod: CPTII,,, | Performed by: ORTHOPAEDIC SURGERY

## 2023-09-18 PROCEDURE — 99213 OFFICE O/P EST LOW 20 MIN: CPT | Mod: ,,, | Performed by: ORTHOPAEDIC SURGERY

## 2023-09-18 PROCEDURE — 3008F PR BODY MASS INDEX (BMI) DOCUMENTED: ICD-10-PCS | Mod: CPTII,,, | Performed by: ORTHOPAEDIC SURGERY

## 2023-09-18 NOTE — PROGRESS NOTES
Chief Complaint:   Chief Complaint   Patient presents with    Knee Pain     f/u for right knee pain. states knee never really improved. tries to exercise every day. did therapy with [facility] which helps some but gets to a point where his knees hurt again. states both knees have been bothering him. states more discomfort rather than pain       Consulting Physician: No ref. provider found    History of present illness:    He is a pleasant 53-year-old who presents with bilateral knee pain.  He is status post ACL reconstruction in October 2022. He has also had an increase in right knee pain. Since his last visit he noticed improvement in both knees. He is working with formal therapy. He reports recently he's had an increase in pain bilaterally, right worse than left. He has clicking in the right knee. He has concerns about returning to work as a . He recently discussed with his nephrologist about starting Celebrex and wanted our opinion on it. He is trying to avoid injections or repeat surgery.     He returns today.  His knees feel about the same.      Past Medical History:   Diagnosis Date    Bone spur neck     Congenital absence of one kidney     E. coli after prostate bx, currently on abx     GERD (gastroesophageal reflux disease)     History of chicken pox     Irregular heart beat     Irritable bowel syndrome without diarrhea     Kidney stones     Nonerosive esophageal reflux disease     Personal history of colonic polyps     Personal history of colonic polyps     Prostatitis     Swallowing difficulty        Past Surgical History:   Procedure Laterality Date    abdominal ultrasound      COLONOSCOPY  04/20/2022    COLONOSCOPY  03/30/2017    ESOPHAGOGASTRODUODENOSCOPY      2021, 02/25/21, 12/29/17,    ESOPHAGOGASTRODUODENOSCOPY N/A 07/25/2022    Procedure: EGD w/ BRAVO PLACEMENT 48 HR;  Surgeon: SHELTON Schreiber MD;  Location: [facility] ENDOSCOPY;  Service: Gastroenterology;  Laterality: N/A;  Pt advised  by office to hold PPI x 3 days before procedure.    INSERTION OF PH PROBE N/A 07/25/2022    Procedure: FAM;  Surgeon: SHELTON Schreiber MD;  Location: [facility] ENDOSCOPY;  Service: Gastroenterology;  Laterality: N/A;    LITHOTRIPSY      x2    PARTIAL NEPHRECTOMY      PROSTATE BIOPSY      x2       Current Outpatient Medications   Medication Sig    ascorbic acid, vitamin C, (VITAMIN C) 100 MG tablet Take 100 mg by mouth once daily.    celecoxib (CELEBREX) 200 MG capsule Take by mouth daily as needed.    ciprofloxacin HCl (CIPRO) 500 MG tablet Take 500 mg by mouth 2 (two) times daily.    ergocalciferol, vitamin D2, (VITAMIN D ORAL) Take by mouth.    EScitalopram oxalate (LEXAPRO) 5 MG Tab Take 5 mg by mouth.    esomeprazole magnesium (NEXIUM) 10 mg suspension Take 10 mg by mouth before breakfast.    famotidine (PEPCID) 40 MG tablet Take 40 mg by mouth once daily.    glycopyrrolate (ROBINUL) 1 mg Tab Take 1 mg by mouth daily as needed.    pantoprazole (PROTONIX) 40 MG tablet Take 40 mg by mouth once daily.    sildenafil (REVATIO) 20 mg Tab Take 20 mg by mouth daily as needed.    zinc sulfate (ZINC-15 ORAL) Take by mouth.     No current facility-administered medications for this visit.       Review of patient's allergies indicates:   Allergen Reactions    Penicillins        Family History   Problem Relation Age of Onset    No Known Problems Mother     No Known Problems Father        Social History     Socioeconomic History    Marital status: Unknown   Tobacco Use    Smoking status: Never    Smokeless tobacco: Never   Substance and Sexual Activity    Alcohol use: Yes     Comment: socially    Drug use: Never    Sexual activity: Yes     Partners: Female       Review of Systems:    Constitution:   Denies chills, fever, and sweats.  HENT:   Denies headaches or blurry vision.  Cardiovascular:  Denies chest pain or irregular heart beat.  Respiratory:   Denies cough or shortness of breath.  Gastrointestinal:  Denies  "abdominal pain, nausea, or vomiting.  Musculoskeletal:   Denies muscle cramps.  Neurological:   Denies dizziness or focal weakness.  Psychiatric/Behavior: Normal mental status.  Hematology/Lymph:  Denies bleeding problem or easy bruising/bleeding.  Skin:    Denies rash or suspicious lesions.    Examination:    Vital Signs:    Vitals:    09/18/23 1620   BP: (!) 149/94   Pulse: 77   Weight: 86.6 kg (191 lb)   Height: 5' 9" (1.753 m)         Body mass index is 28.21 kg/m².    Constitution:   Well-developed, well nourished patient in no acute distress.  Neurological:   Alert and oriented x 3 and cooperative to examination.     Psychiatric/Behavior: Normal mental status.  Respiratory:   No shortness of breath.  Cards:   Pulses palpable, brisk cap refill  Eyes:    Extraoccular muscles intact  Skin:    No scars, rash or suspicious lesions.    MSK:   Standing exam  stance: normal alignment, no significant leg-length discrepancy  gait: quad avoidance    Knee examination  - General comments:  Healed left knee incisions with quad atrophy.  - Tenderness:parapatellar    Knee                  RIGHT    LEFT  Skin:                  Intact      Intact  ROM:                 0-130      0-120  Effusion:             Neg         trace  MJL TTP:            +                +  LJL TTP:            Neg         Neg  Kam:          +               +  Pat crep:             +               +  Patella TTPs:     Neg         Neg  Patella grind:      Neg        Neg  Lachman:           Neg        Neg  Pivot shift:          Neg        Neg  Valgus stress:    Neg        Neg  Varus stress:      Neg        Neg  Posterior drawer: Neg       Neg    N-V intact intact  Hip: nml nml    Lower extremity edema:Negative        Assessment: S/P ACL reconstruction    Quadriceps weakness        Plan:  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  I will see him back in about 3-4 " months for recheck

## 2023-09-18 NOTE — LETTER
September 18, 2023       [facility]  [address], [address]  [place] [address]  Phone: [phone]  Fax: [phone]       Patient: Chava Bah   YOB: 1970  Date of Visit: 09/18/2023    To Whom It May Concern:    Aurora Bah  was at [facility] on 09/18/2023. The patient may return to work on 09/27/2023 with no restrictions. If you have any questions or concerns, or if I can be of further assistance, please do not hesitate to contact me.    Sincerely,    Jj He M.D.

## 2023-09-18 NOTE — LETTER
September 18, 2023       [facility]  [address], [address]  [place] [address]  Phone: [phone]  Fax: [phone]       Patient: Chava Bah   YOB: 1970  Date of Visit: 09/18/2023    To Whom It May Concern:    Aurora Bah  was at [facility] on 09/18/2023. The patient may return to work on 09/27/23 with no restrictions- Released to Full duty. If you have any questions or concerns, or if I can be of further assistance, please do not hesitate to contact me.    Sincerely,    Jj He M.D.

## 2024-05-08 ENCOUNTER — APPOINTMENT (RX ONLY)
Dept: URBAN - METROPOLITAN AREA CLINIC 110 | Facility: CLINIC | Age: 54
Setting detail: DERMATOLOGY
End: 2024-05-08

## 2024-05-08 VITALS — WEIGHT: 230 LBS | HEIGHT: 68 IN

## 2024-05-08 DIAGNOSIS — D18.0 HEMANGIOMA: ICD-10-CM

## 2024-05-08 DIAGNOSIS — L82.1 OTHER SEBORRHEIC KERATOSIS: ICD-10-CM

## 2024-05-08 DIAGNOSIS — D22 MELANOCYTIC NEVI: ICD-10-CM

## 2024-05-08 DIAGNOSIS — L81.4 OTHER MELANIN HYPERPIGMENTATION: ICD-10-CM

## 2024-05-08 DIAGNOSIS — L85.3 XEROSIS CUTIS: ICD-10-CM

## 2024-05-08 PROBLEM — D22.5 MELANOCYTIC NEVI OF TRUNK: Status: ACTIVE | Noted: 2024-05-08

## 2024-05-08 PROBLEM — D22.71 MELANOCYTIC NEVI OF RIGHT LOWER LIMB, INCLUDING HIP: Status: ACTIVE | Noted: 2024-05-08

## 2024-05-08 PROBLEM — D22.62 MELANOCYTIC NEVI OF LEFT UPPER LIMB, INCLUDING SHOULDER: Status: ACTIVE | Noted: 2024-05-08

## 2024-05-08 PROBLEM — D22.61 MELANOCYTIC NEVI OF RIGHT UPPER LIMB, INCLUDING SHOULDER: Status: ACTIVE | Noted: 2024-05-08

## 2024-05-08 PROBLEM — D22.72 MELANOCYTIC NEVI OF LEFT LOWER LIMB, INCLUDING HIP: Status: ACTIVE | Noted: 2024-05-08

## 2024-05-08 PROBLEM — D18.01 HEMANGIOMA OF SKIN AND SUBCUTANEOUS TISSUE: Status: ACTIVE | Noted: 2024-05-08

## 2024-05-08 PROCEDURE — ? COUNSELING

## 2024-05-08 PROCEDURE — 99213 OFFICE O/P EST LOW 20 MIN: CPT

## 2024-05-08 PROCEDURE — ? SUNSCREEN RECOMMENDATIONS

## 2024-05-08 ASSESSMENT — LOCATION SIMPLE DESCRIPTION DERM
LOCATION SIMPLE: LEFT FOREHEAD
LOCATION SIMPLE: LEFT BREAST
LOCATION SIMPLE: LEFT UPPER ARM
LOCATION SIMPLE: LEFT HAND
LOCATION SIMPLE: UPPER BACK
LOCATION SIMPLE: RIGHT CALF
LOCATION SIMPLE: RIGHT CHEEK
LOCATION SIMPLE: RIGHT POSTERIOR UPPER ARM
LOCATION SIMPLE: RIGHT THIGH
LOCATION SIMPLE: RIGHT UPPER BACK
LOCATION SIMPLE: RIGHT HAND
LOCATION SIMPLE: CHEST
LOCATION SIMPLE: RIGHT UPPER ARM
LOCATION SIMPLE: RIGHT CALF
LOCATION SIMPLE: LEFT POSTERIOR UPPER ARM
LOCATION SIMPLE: RIGHT CHEEK
LOCATION SIMPLE: LEFT THIGH

## 2024-05-08 ASSESSMENT — LOCATION DETAILED DESCRIPTION DERM
LOCATION DETAILED: LEFT ULNAR DORSAL HAND
LOCATION DETAILED: LEFT INFERIOR MEDIAL FOREHEAD
LOCATION DETAILED: RIGHT ANTERIOR DISTAL THIGH
LOCATION DETAILED: RIGHT PROXIMAL CALF
LOCATION DETAILED: RIGHT PROXIMAL CALF
LOCATION DETAILED: LEFT ANTERIOR DISTAL UPPER ARM
LOCATION DETAILED: INFERIOR THORACIC SPINE
LOCATION DETAILED: RIGHT RADIAL DORSAL HAND
LOCATION DETAILED: RIGHT LATERAL BUCCAL CHEEK
LOCATION DETAILED: RIGHT LATERAL BUCCAL CHEEK
LOCATION DETAILED: RIGHT ANTERIOR PROXIMAL THIGH
LOCATION DETAILED: LEFT ANTERIOR PROXIMAL THIGH
LOCATION DETAILED: LEFT ANTERIOR PROXIMAL UPPER ARM
LOCATION DETAILED: RIGHT DISTAL POSTERIOR UPPER ARM
LOCATION DETAILED: LOWER STERNUM
LOCATION DETAILED: LEFT ANTERIOR DISTAL THIGH
LOCATION DETAILED: LEFT PROXIMAL POSTERIOR UPPER ARM
LOCATION DETAILED: UPPER STERNUM
LOCATION DETAILED: LEFT MEDIAL BREAST 9-10:00 REGION
LOCATION DETAILED: MIDDLE STERNUM
LOCATION DETAILED: RIGHT ANTERIOR DISTAL UPPER ARM
LOCATION DETAILED: RIGHT SUPERIOR MEDIAL UPPER BACK
LOCATION DETAILED: RIGHT MEDIAL UPPER BACK

## 2024-05-08 ASSESSMENT — LOCATION ZONE DERM
LOCATION ZONE: FACE
LOCATION ZONE: TRUNK
LOCATION ZONE: ARM
LOCATION ZONE: HAND
LOCATION ZONE: LEG
LOCATION ZONE: LEG
LOCATION ZONE: FACE

## 2024-05-08 NOTE — PROCEDURE: REASSURANCE
Additional Note: Reassured benign in nature
Hide Include Location In Plan Question?: No
Detail Level: Zone
Additional Note: Reassured benign in nature.
Detail Level: Simple

## 2024-06-17 ENCOUNTER — HOSPITAL ENCOUNTER (OUTPATIENT)
Dept: RADIOLOGY | Facility: CLINIC | Age: 54
Discharge: HOME | End: 2024-06-17
Attending: ORTHOPAEDIC SURGERY
Payer: COMMERCIAL

## 2024-06-17 ENCOUNTER — OFFICE VISIT (OUTPATIENT)
Dept: ORTHOPEDICS | Facility: CLINIC | Age: 54
End: 2024-06-17
Payer: COMMERCIAL

## 2024-06-17 VITALS
WEIGHT: 190.94 LBS | DIASTOLIC BLOOD PRESSURE: 96 MMHG | HEIGHT: 69 IN | SYSTOLIC BLOOD PRESSURE: 154 MMHG | HEART RATE: 74 BPM | BODY MASS INDEX: 28.28 KG/M2

## 2024-06-17 DIAGNOSIS — M25.562 ACUTE PAIN OF BOTH KNEES: ICD-10-CM

## 2024-06-17 DIAGNOSIS — Z98.890 S/P ACL RECONSTRUCTION: ICD-10-CM

## 2024-06-17 DIAGNOSIS — M23.91 INTERNAL DERANGEMENT OF RIGHT KNEE: Primary | ICD-10-CM

## 2024-06-17 DIAGNOSIS — M62.81 QUADRICEPS WEAKNESS: ICD-10-CM

## 2024-06-17 DIAGNOSIS — M25.561 ACUTE PAIN OF BOTH KNEES: ICD-10-CM

## 2024-06-17 PROCEDURE — 99213 OFFICE O/P EST LOW 20 MIN: CPT | Mod: ,,, | Performed by: ORTHOPAEDIC SURGERY

## 2024-06-17 PROCEDURE — 3080F DIAST BP >= 90 MM HG: CPT | Mod: CPTII,,, | Performed by: ORTHOPAEDIC SURGERY

## 2024-06-17 PROCEDURE — 1159F MED LIST DOCD IN RCRD: CPT | Mod: CPTII,,, | Performed by: ORTHOPAEDIC SURGERY

## 2024-06-17 PROCEDURE — 3008F BODY MASS INDEX DOCD: CPT | Mod: CPTII,,, | Performed by: ORTHOPAEDIC SURGERY

## 2024-06-17 PROCEDURE — 73564 X-RAY EXAM KNEE 4 OR MORE: CPT | Mod: 26,,, | Performed by: ORTHOPAEDIC SURGERY

## 2024-06-17 PROCEDURE — 3077F SYST BP >= 140 MM HG: CPT | Mod: CPTII,,, | Performed by: ORTHOPAEDIC SURGERY

## 2024-06-17 RX ORDER — ROSUVASTATIN CALCIUM 5 MG/1
5 TABLET, COATED ORAL
COMMUNITY
Start: 2024-05-23

## 2024-06-17 NOTE — PROGRESS NOTES
Chief Complaint:   Chief Complaint   Patient presents with    Follow-up      f/u Jaylan knee pain states it has gotten better. Pain comes and goes says 2 weeks ago says it could have been from lack of movement because he hadn't been moving around. Denies swelling. Says it kind of  feels like fluid but he's unsure denies numbness and tingling just reports a dull pain in both knees        Consulting Physician: No ref. provider found    History of present illness:    He is a pleasant 53-year-old who presents with bilateral knee pain.  He is status post ACL reconstruction in October 2022 ([place]). He has also had an increase in right knee pain. Since his last visit he noticed improvement in both knees. He is working with formal therapy. He reports recently he's had an increase in pain bilaterally, right worse than left. He has clicking in the right knee. He has concerns about returning to work as a . He recently discussed with his nephrologist about starting Celebrex and wanted our opinion on it. He is trying to avoid injections or repeat surgery.     He returns today.  Overall his knees are doing about as baseline.  His right knee still has continued pain anteriorly and medially.  It does cause him pain intermittently.  He occasionally has mechanical symptoms within the knee.  Left knee pain has been consistent in around the kneecap and worse with activity such as going up and down stairs.  It is somewhat better with rest.  He will occasionally use bracing.  He is using Celebrex intermittently.  He is interested in trying Voltaren and/or another topical rub.      Past Medical History:   Diagnosis Date    Bone spur neck     Congenital absence of one kidney     E. coli after prostate bx, currently on abx     GERD (gastroesophageal reflux disease)     History of chicken pox     Irregular heart beat     Irritable bowel syndrome without diarrhea     Kidney stones     Nonerosive esophageal reflux disease     Personal  history of colonic polyps     Personal history of colonic polyps     Prostatitis     Swallowing difficulty        Past Surgical History:   Procedure Laterality Date    abdominal ultrasound      COLONOSCOPY  04/20/2022    COLONOSCOPY  03/30/2017    ESOPHAGOGASTRODUODENOSCOPY      2021, 02/25/21, 12/29/17,    ESOPHAGOGASTRODUODENOSCOPY N/A 07/25/2022    Procedure: EGD w/ BRAVO PLACEMENT 48 HR;  Surgeon: SHELTON Schreiber MD;  Location: [facility] ENDOSCOPY;  Service: Gastroenterology;  Laterality: N/A;  Pt advised by office to hold PPI x 3 days before procedure.    INSERTION OF PH PROBE N/A 07/25/2022    Procedure: FAM;  Surgeon: SHELTON Schreiber MD;  Location: [facility] ENDOSCOPY;  Service: Gastroenterology;  Laterality: N/A;    LITHOTRIPSY      x2    PARTIAL NEPHRECTOMY      PROSTATE BIOPSY      x2       Current Outpatient Medications   Medication Sig    ascorbic acid, vitamin C, (VITAMIN C) 100 MG tablet Take 100 mg by mouth once daily.    celecoxib (CELEBREX) 200 MG capsule Take by mouth daily as needed.    ergocalciferol, vitamin D2, (VITAMIN D ORAL) Take by mouth.    esomeprazole magnesium (NEXIUM) 10 mg suspension Take 10 mg by mouth before breakfast.    famotidine (PEPCID) 40 MG tablet Take 40 mg by mouth once daily.    glycopyrrolate (ROBINUL) 1 mg Tab Take 1 mg by mouth daily as needed.    rosuvastatin (CRESTOR) 5 MG tablet Take 5 mg by mouth.    sildenafil (REVATIO) 20 mg Tab Take 20 mg by mouth daily as needed.    zinc sulfate (ZINC-15 ORAL) Take by mouth.    ciprofloxacin HCl (CIPRO) 500 MG tablet Take 500 mg by mouth 2 (two) times daily. (Patient not taking: Reported on 6/17/2024)    EScitalopram oxalate (LEXAPRO) 5 MG Tab Take 5 mg by mouth. (Patient not taking: Reported on 6/17/2024)    pantoprazole (PROTONIX) 40 MG tablet Take 40 mg by mouth once daily. (Patient not taking: Reported on 6/17/2024)     No current facility-administered medications for this visit.       Review of patient's  "allergies indicates:   Allergen Reactions    Penicillins        Family History   Problem Relation Name Age of Onset    No Known Problems Mother      No Known Problems Father         Social History     Socioeconomic History    Marital status: Unknown   Tobacco Use    Smoking status: Never    Smokeless tobacco: Never   Substance and Sexual Activity    Alcohol use: Yes     Comment: socially    Drug use: Never    Sexual activity: Yes     Partners: Female       Review of Systems:    Constitution:   Denies chills, fever, and sweats.  HENT:   Denies headaches or blurry vision.  Cardiovascular:  Denies chest pain or irregular heart beat.  Respiratory:   Denies cough or shortness of breath.  Gastrointestinal:  Denies abdominal pain, nausea, or vomiting.  Musculoskeletal:   Denies muscle cramps.  Neurological:   Denies dizziness or focal weakness.  Psychiatric/Behavior: Normal mental status.  Hematology/Lymph:  Denies bleeding problem or easy bruising/bleeding.  Skin:    Denies rash or suspicious lesions.    Examination:    Vital Signs:    Vitals:    06/17/24 1644   BP: (!) 154/96   Pulse: 74   Weight: 86.6 kg (190 lb 14.7 oz)   Height: 5' 9" (1.753 m)         Body mass index is 28.19 kg/m².    Constitution:   Well-developed, well nourished patient in no acute distress.  Neurological:   Alert and oriented x 3 and cooperative to examination.     Psychiatric/Behavior: Normal mental status.  Respiratory:   No shortness of breath.  Cards:   Pulses palpable, brisk cap refill  Eyes:    Extraoccular muscles intact  Skin:    No scars, rash or suspicious lesions.    MSK:   Standing exam  stance: normal alignment, no significant leg-length discrepancy  gait: quad avoidance    Knee examination  - General comments:  Healed left knee incisions with quad atrophy.  - Tenderness:parapatellar    Knee                  RIGHT    LEFT  Skin:                  Intact      Intact  ROM:                 0-130      0-120  Effusion:             Neg      "    trace  MJL TTP:            +                +  LJL TTP:            Neg         Neg  Kam:          +               +  Pat crep:             +               +  Patella TTPs:     Neg         Neg  Patella grind:      Neg        Neg  Lachman:           Neg        Neg  Pivot shift:          Neg        Neg  Valgus stress:    Neg        Neg  Varus stress:      Neg        Neg  Posterior drawer: Neg       Neg    N-V intact intact  Hip: nml nml    Lower extremity edema:Negative        Assessment: Internal derangement of right knee  -     X-Ray Knee Complete 4 Or More Views Bilat; Future; Expected date: 06/17/2024    S/P ACL reconstruction    Quadriceps weakness        Plan:  He is going to continue his home exercise program for strengthening around the knees.  He is going to look into starting some Voltaren.  I will see him back in 4-5 months for recheck.  We could consider possible injections or advanced imaging of the right knee

## 2024-10-30 ENCOUNTER — OFFICE VISIT (OUTPATIENT)
Dept: ORTHOPEDICS | Facility: CLINIC | Age: 54
End: 2024-10-30
Payer: COMMERCIAL

## 2024-10-30 VITALS
HEART RATE: 67 BPM | HEIGHT: 69 IN | WEIGHT: 190.94 LBS | DIASTOLIC BLOOD PRESSURE: 93 MMHG | BODY MASS INDEX: 28.28 KG/M2 | SYSTOLIC BLOOD PRESSURE: 138 MMHG

## 2024-10-30 DIAGNOSIS — Z98.890 S/P ACL RECONSTRUCTION: Primary | ICD-10-CM

## 2024-10-30 PROCEDURE — 3080F DIAST BP >= 90 MM HG: CPT | Mod: CPTII,,, | Performed by: ORTHOPAEDIC SURGERY

## 2024-10-30 PROCEDURE — 99213 OFFICE O/P EST LOW 20 MIN: CPT | Mod: ,,, | Performed by: ORTHOPAEDIC SURGERY

## 2024-10-30 PROCEDURE — 3075F SYST BP GE 130 - 139MM HG: CPT | Mod: CPTII,,, | Performed by: ORTHOPAEDIC SURGERY

## 2024-10-30 PROCEDURE — 3008F BODY MASS INDEX DOCD: CPT | Mod: CPTII,,, | Performed by: ORTHOPAEDIC SURGERY

## 2024-10-30 PROCEDURE — 1159F MED LIST DOCD IN RCRD: CPT | Mod: CPTII,,, | Performed by: ORTHOPAEDIC SURGERY

## 2024-11-11 ENCOUNTER — APPOINTMENT (RX ONLY)
Dept: URBAN - METROPOLITAN AREA CLINIC 110 | Facility: CLINIC | Age: 54
Setting detail: DERMATOLOGY
End: 2024-11-11

## 2024-11-11 DIAGNOSIS — Z71.89 OTHER SPECIFIED COUNSELING: ICD-10-CM

## 2024-11-11 DIAGNOSIS — L82.1 OTHER SEBORRHEIC KERATOSIS: ICD-10-CM

## 2024-11-11 DIAGNOSIS — L81.4 OTHER MELANIN HYPERPIGMENTATION: ICD-10-CM

## 2024-11-11 DIAGNOSIS — L85.3 XEROSIS CUTIS: ICD-10-CM

## 2024-11-11 DIAGNOSIS — D18.0 HEMANGIOMA: ICD-10-CM

## 2024-11-11 PROBLEM — D18.01 HEMANGIOMA OF SKIN AND SUBCUTANEOUS TISSUE: Status: ACTIVE | Noted: 2024-11-11

## 2024-11-11 PROCEDURE — 99213 OFFICE O/P EST LOW 20 MIN: CPT

## 2024-11-11 PROCEDURE — ? COUNSELING

## 2024-11-11 ASSESSMENT — LOCATION SIMPLE DESCRIPTION DERM
LOCATION SIMPLE: LEFT THIGH
LOCATION SIMPLE: CHEST
LOCATION SIMPLE: RIGHT UPPER BACK
LOCATION SIMPLE: POSTERIOR NECK
LOCATION SIMPLE: RIGHT CHEEK
LOCATION SIMPLE: ABDOMEN
LOCATION SIMPLE: LEFT LOWER BACK
LOCATION SIMPLE: LEFT FOREARM
LOCATION SIMPLE: RIGHT SHOULDER
LOCATION SIMPLE: LEFT SHOULDER
LOCATION SIMPLE: RIGHT FOREARM
LOCATION SIMPLE: RIGHT THIGH

## 2024-11-11 ASSESSMENT — LOCATION DETAILED DESCRIPTION DERM
LOCATION DETAILED: RIGHT INFERIOR CENTRAL MALAR CHEEK
LOCATION DETAILED: LEFT POSTERIOR SHOULDER
LOCATION DETAILED: LEFT SUPERIOR MEDIAL MIDBACK
LOCATION DETAILED: RIGHT DISTAL RADIAL DORSAL FOREARM
LOCATION DETAILED: STERNUM
LOCATION DETAILED: RIGHT SUPERIOR MEDIAL UPPER BACK
LOCATION DETAILED: PERIUMBILICAL SKIN
LOCATION DETAILED: RIGHT MEDIAL TRAPEZIAL NECK
LOCATION DETAILED: RIGHT ANTERIOR PROXIMAL THIGH
LOCATION DETAILED: LEFT ANTERIOR DISTAL THIGH
LOCATION DETAILED: LEFT DISTAL DORSAL FOREARM
LOCATION DETAILED: RIGHT ANTERIOR SHOULDER
LOCATION DETAILED: LEFT ANTERIOR SHOULDER
LOCATION DETAILED: EPIGASTRIC SKIN
LOCATION DETAILED: RIGHT POSTERIOR SHOULDER

## 2024-11-11 ASSESSMENT — LOCATION ZONE DERM
LOCATION ZONE: LEG
LOCATION ZONE: FACE
LOCATION ZONE: NECK
LOCATION ZONE: ARM
LOCATION ZONE: TRUNK

## 2024-11-11 NOTE — HPI: FULL BODY SKIN EXAMINATION
What Is The Reason For Today's Visit?: Full Body Skin Examination
What Is The Reason For Today's Visit? (Being Monitored For X): concerning skin lesions on an annual basis
How Severe Are Your Spot(S)?: mild
Additional History: Pt states he has a new mole forming in his neck that he would like to have checked

## 2024-11-13 ENCOUNTER — OFFICE VISIT (OUTPATIENT)
Dept: ORTHOPEDICS | Facility: CLINIC | Age: 54
End: 2024-11-13
Payer: COMMERCIAL

## 2024-11-13 VITALS
WEIGHT: 191 LBS | BODY MASS INDEX: 28.29 KG/M2 | HEIGHT: 69 IN | HEART RATE: 62 BPM | SYSTOLIC BLOOD PRESSURE: 131 MMHG | DIASTOLIC BLOOD PRESSURE: 84 MMHG

## 2024-11-13 DIAGNOSIS — M76.51 PATELLAR TENDINITIS OF RIGHT KNEE: Primary | ICD-10-CM

## 2024-11-13 RX ORDER — CELECOXIB 200 MG/1
200 CAPSULE ORAL DAILY
Qty: 30 CAPSULE | Refills: 2 | Status: SHIPPED | OUTPATIENT
Start: 2024-11-13 | End: 2025-02-11

## 2024-11-13 NOTE — PROGRESS NOTES
Chief Complaint:   Chief Complaint   Patient presents with    Right Knee - Pain     Patient stated a trailer was pushed on his right knee, this happened on 11/7/24. States he feels pinching around knee cap.     Left Knee - Pain     Patient states his knee has been hurting since his last appointment with us. Patient stated he is taking Celebrex to drain fluid.        Consulting Physician: No ref. provider found    History of present illness:    he is a pleasant 54 y.o. year old male who returns today with a new injury to his right knee.  He was lifting up a trailer on an incline and injured the right knee.  The injury took place on 11/07/2024.  He had some pain and mild swelling.  He knows his pain around the patella tendon.  It tends to be worse with activity and standing.  It is better at rest and better with walking.  He denies any new numbness or tingling.  He is using Voltaren gel and Celebrex intermittently.    Past Medical History:   Diagnosis Date    Bone spur neck     Congenital absence of one kidney     E. coli after prostate bx, currently on abx     GERD (gastroesophageal reflux disease)     History of chicken pox     Irregular heart beat     Irritable bowel syndrome without diarrhea     Kidney stones     Nonerosive esophageal reflux disease     Personal history of colonic polyps     Personal history of colonic polyps     Prostatitis     Swallowing difficulty        Past Surgical History:   Procedure Laterality Date    abdominal ultrasound      COLONOSCOPY  04/20/2022    COLONOSCOPY  03/30/2017    ESOPHAGOGASTRODUODENOSCOPY      2021, 02/25/21, 12/29/17,    ESOPHAGOGASTRODUODENOSCOPY N/A 07/25/2022    Procedure: EGD w/ BRAVO PLACEMENT 48 HR;  Surgeon: SHELTON Schreiber MD;  Location: [facility] ENDOSCOPY;  Service: Gastroenterology;  Laterality: N/A;  Pt advised by office to hold PPI x 3 days before procedure.    HERNIA REPAIR      INSERTION OF PH PROBE N/A 07/25/2022    Procedure: BRAVO;  Surgeon: SHELTON  Nick Schreiber MD;  Location: [facility] ENDOSCOPY;  Service: Gastroenterology;  Laterality: N/A;    LITHOTRIPSY      x2    PARTIAL NEPHRECTOMY      PROSTATE BIOPSY      x2       Current Outpatient Medications   Medication Sig    ascorbic acid, vitamin C, (VITAMIN C) 100 MG tablet Take 100 mg by mouth once daily.    celecoxib (CELEBREX) 200 MG capsule Take by mouth daily as needed.    ciprofloxacin HCl (CIPRO) 500 MG tablet Take 500 mg by mouth 2 (two) times daily.    ergocalciferol, vitamin D2, (VITAMIN D ORAL) Take by mouth.    EScitalopram oxalate (LEXAPRO) 5 MG Tab Take 5 mg by mouth.    esomeprazole magnesium (NEXIUM) 10 mg suspension Take 10 mg by mouth before breakfast.    famotidine (PEPCID) 40 MG tablet Take 40 mg by mouth once daily.    glycopyrrolate (ROBINUL) 1 mg Tab Take 1 mg by mouth daily as needed.    pantoprazole (PROTONIX) 40 MG tablet Take 40 mg by mouth once daily.    rosuvastatin (CRESTOR) 5 MG tablet Take 5 mg by mouth.    sildenafil (REVATIO) 20 mg Tab Take 20 mg by mouth daily as needed.    zinc sulfate (ZINC-15 ORAL) Take by mouth.     No current facility-administered medications for this visit.       Review of patient's allergies indicates:   Allergen Reactions    Atorvastatin Hives    Methylprednisolone Hives    Penicillins        Family History   Problem Relation Name Age of Onset    No Known Problems Mother      No Known Problems Father         Social History     Socioeconomic History    Marital status: Unknown   Tobacco Use    Smoking status: Former     Current packs/day: 1.00     Average packs/day: 1 pack/day for 10.0 years (10.0 ttl pk-yrs)     Types: Cigarettes    Smokeless tobacco: Never   Substance and Sexual Activity    Alcohol use: Yes     Comment: Occasional    Drug use: Never    Sexual activity: Yes     Partners: Female       Review of Systems:    Constitution:   Denies chills, fever, and sweats.  HENT:   Denies headaches or blurry vision.  Cardiovascular:  Denies chest  "pain or irregular heart beat.  Respiratory:   Denies cough or shortness of breath.  Gastrointestinal:  Denies abdominal pain, nausea, or vomiting.  Musculoskeletal:   Denies muscle cramps.  Neurological:   Denies dizziness or focal weakness.  Psychiatric/Behavior: Normal mental status.  Hematology/Lymph:  Denies bleeding problem or easy bruising/bleeding.  Skin:    Denies rash or suspicious lesions.    Examination:    Vital Signs:    Vitals:    11/13/24 0947   BP: 131/84   Pulse: 62   Weight: 86.6 kg (191 lb)   Height: 5' 9" (1.753 m)       Body mass index is 28.21 kg/m².    Constitution:   Well-developed, well nourished patient in no acute distress.  Neurological:   Alert and oriented x 3 and cooperative to examination.     Psychiatric/Behavior: Normal mental status.  Respiratory:   No shortness of breath.  Cards:   Pulses palpable, brisk cap refill  Eyes:    Extraoccular muscles intact  Skin:    No scars, rash or suspicious lesions.    MSK:   Standing exam  stance: normal alignment, no significant leg-length discrepancy  gait: antalgic    Knee examination  - General comments: unremarkable appearance    - Tenderness:patella insertion     Knee                  RIGHT    LEFT  Skin:                  Intact      Intact  ROM:                 0-130      0-130  Effusion:              +            Neg  MJL TTP:            +           Neg  LJL TTP:            Neg         Neg  Kam:           +           Neg  Pat crep:            Neg         Neg  Patella TTPs:     Neg         Neg  Patella grind:      Neg        Neg  Lachman:           Neg        Neg  Pivot shift:          Neg        Neg  Valgus stress:    Neg        Neg  Varus stress:      Neg        Neg  Posterior drawer: Neg       Neg    N-V intact intact  Hip: nml nml    Lower extremity edema:Negative      Assessment: Patellar tendinitis of right knee        Plan:  He is going to continue the Celebrex and Voltaren.  If his pain persists we could consider an " intra-articular injection or a MRI of the right knee.  I will see him back in 3-4 weeks with radiographs of the right knee

## 2024-12-11 ENCOUNTER — HOSPITAL ENCOUNTER (OUTPATIENT)
Dept: RADIOLOGY | Facility: CLINIC | Age: 54
Discharge: HOME | End: 2024-12-11
Attending: ORTHOPAEDIC SURGERY
Payer: COMMERCIAL

## 2024-12-11 ENCOUNTER — OFFICE VISIT (OUTPATIENT)
Dept: ORTHOPEDICS | Facility: CLINIC | Age: 54
End: 2024-12-11
Payer: COMMERCIAL

## 2024-12-11 VITALS
WEIGHT: 190.94 LBS | HEIGHT: 69 IN | BODY MASS INDEX: 28.28 KG/M2 | HEART RATE: 69 BPM | SYSTOLIC BLOOD PRESSURE: 129 MMHG | DIASTOLIC BLOOD PRESSURE: 87 MMHG

## 2024-12-11 DIAGNOSIS — M76.51 PATELLAR TENDINITIS OF RIGHT KNEE: ICD-10-CM

## 2024-12-11 DIAGNOSIS — M22.41 CHONDROMALACIA OF RIGHT PATELLA: Primary | ICD-10-CM

## 2024-12-11 PROCEDURE — 1159F MED LIST DOCD IN RCRD: CPT | Mod: CPTII,,, | Performed by: ORTHOPAEDIC SURGERY

## 2024-12-11 PROCEDURE — 73564 X-RAY EXAM KNEE 4 OR MORE: CPT | Mod: RT,,, | Performed by: ORTHOPAEDIC SURGERY

## 2024-12-11 PROCEDURE — 3074F SYST BP LT 130 MM HG: CPT | Mod: CPTII,,, | Performed by: ORTHOPAEDIC SURGERY

## 2024-12-11 PROCEDURE — 3079F DIAST BP 80-89 MM HG: CPT | Mod: CPTII,,, | Performed by: ORTHOPAEDIC SURGERY

## 2024-12-11 PROCEDURE — 3008F BODY MASS INDEX DOCD: CPT | Mod: CPTII,,, | Performed by: ORTHOPAEDIC SURGERY

## 2024-12-11 PROCEDURE — 99213 OFFICE O/P EST LOW 20 MIN: CPT | Mod: ,,, | Performed by: ORTHOPAEDIC SURGERY

## 2024-12-11 NOTE — PROGRESS NOTES
Chief Complaint:   Chief Complaint   Patient presents with    Right Knee - Follow-up     Stated pain has been off and on. Pain is mostly just behind that knee cap.  Denies any swelling or numbness. Has not been taking celebrex due to it being ineffective.        Consulting Physician: No ref. provider found    History of present illness:    he is a pleasant 54 y.o. year old male who returns today with a new injury to his right knee.  He was lifting up a trailer on an incline and injured the right knee.  The injury took place on 11/07/2024.  He had some pain and mild swelling.  He knows his pain around the patella tendon.  It tends to be worse with activity and standing.  It is better at rest and better with walking.  He denies any new numbness or tingling.  He is using Voltaren gel and Celebrex intermittently.    He returns today.  The pain seems to be improving but he does feel it underneath the kneecap.  He notices mechanical symptoms under the knee where it feels like it catches on him.  He feels an occasional click or pop.    Past Medical History:   Diagnosis Date    Bone spur neck     Congenital absence of one kidney     E. coli after prostate bx, currently on abx     GERD (gastroesophageal reflux disease)     History of chicken pox     Irregular heart beat     Irritable bowel syndrome without diarrhea     Kidney stones     Nonerosive esophageal reflux disease     Personal history of colonic polyps     Personal history of colonic polyps     Prostatitis     Swallowing difficulty        Past Surgical History:   Procedure Laterality Date    abdominal ultrasound      COLONOSCOPY  04/20/2022    COLONOSCOPY  03/30/2017    ESOPHAGOGASTRODUODENOSCOPY      2021, 02/25/21, 12/29/17,    ESOPHAGOGASTRODUODENOSCOPY N/A 07/25/2022    Procedure: EGD w/ BRAVO PLACEMENT 48 HR;  Surgeon: SHELTON Schreiber MD;  Location: [facility] ENDOSCOPY;  Service: Gastroenterology;  Laterality: N/A;  Pt advised by office to hold PPI x 3  days before procedure.    HERNIA REPAIR      INSERTION OF PH PROBE N/A 07/25/2022    Procedure: FAM;  Surgeon: SHELTON Schreiber MD;  Location: [facility] ENDOSCOPY;  Service: Gastroenterology;  Laterality: N/A;    LITHOTRIPSY      x2    PARTIAL NEPHRECTOMY      PROSTATE BIOPSY      x2       Current Outpatient Medications   Medication Sig    ascorbic acid, vitamin C, (VITAMIN C) 100 MG tablet Take 100 mg by mouth once daily.    ciprofloxacin HCl (CIPRO) 500 MG tablet Take 500 mg by mouth 2 (two) times daily.    ergocalciferol, vitamin D2, (VITAMIN D ORAL) Take by mouth.    EScitalopram oxalate (LEXAPRO) 5 MG Tab Take 5 mg by mouth.    esomeprazole magnesium (NEXIUM) 10 mg suspension Take 10 mg by mouth before breakfast.    famotidine (PEPCID) 40 MG tablet Take 40 mg by mouth once daily.    glycopyrrolate (ROBINUL) 1 mg Tab Take 1 mg by mouth daily as needed.    pantoprazole (PROTONIX) 40 MG tablet Take 40 mg by mouth once daily.    rosuvastatin (CRESTOR) 5 MG tablet Take 5 mg by mouth.    sildenafil (REVATIO) 20 mg Tab Take 20 mg by mouth daily as needed.    zinc sulfate (ZINC-15 ORAL) Take by mouth.    celecoxib (CELEBREX) 200 MG capsule Take 1 capsule (200 mg total) by mouth once daily. (Patient not taking: Reported on 12/11/2024)     No current facility-administered medications for this visit.       Review of patient's allergies indicates:   Allergen Reactions    Atorvastatin Hives    Methylprednisolone Hives    Penicillins        Family History   Problem Relation Name Age of Onset    No Known Problems Mother      No Known Problems Father         Social History     Socioeconomic History    Marital status: Unknown   Tobacco Use    Smoking status: Former     Current packs/day: 1.00     Average packs/day: 1 pack/day for 10.0 years (10.0 ttl pk-yrs)     Types: Cigarettes    Smokeless tobacco: Never   Substance and Sexual Activity    Alcohol use: Yes     Comment: Occasional    Drug use: Never    Sexual activity:  "Yes     Partners: Female       Review of Systems:    Constitution:   Denies chills, fever, and sweats.  HENT:   Denies headaches or blurry vision.  Cardiovascular:  Denies chest pain or irregular heart beat.  Respiratory:   Denies cough or shortness of breath.  Gastrointestinal:  Denies abdominal pain, nausea, or vomiting.  Musculoskeletal:   Denies muscle cramps.  Neurological:   Denies dizziness or focal weakness.  Psychiatric/Behavior: Normal mental status.  Hematology/Lymph:  Denies bleeding problem or easy bruising/bleeding.  Skin:    Denies rash or suspicious lesions.    Examination:    Vital Signs:    Vitals:    12/11/24 1020   BP: 129/87   Pulse: 69   Weight: 86.6 kg (190 lb 14.7 oz)   Height: 5' 9" (1.753 m)       Body mass index is 28.19 kg/m².    Constitution:   Well-developed, well nourished patient in no acute distress.  Neurological:   Alert and oriented x 3 and cooperative to examination.     Psychiatric/Behavior: Normal mental status.  Respiratory:   No shortness of breath.  Cards:   Pulses palpable, brisk cap refill  Eyes:    Extraoccular muscles intact  Skin:    No scars, rash or suspicious lesions.    MSK:   Standing exam  stance: normal alignment, no significant leg-length discrepancy  gait: antalgic    Knee examination  - General comments: unremarkable appearance    - Tenderness:patella insertion     Knee                  RIGHT    LEFT  Skin:                  Intact      Intact  ROM:                 0-130      0-130  Effusion:              +            Neg  MJL TTP:            +           Neg  LJL TTP:            Neg         Neg  Kam:           +           Neg  Pat crep:            Neg         Neg  Patella TTPs:     Neg         Neg  Patella grind:      Neg        Neg  Lachman:           Neg        Neg  Pivot shift:          Neg        Neg  Valgus stress:    Neg        Neg  Varus stress:      Neg        Neg  Posterior drawer: Neg       Neg    N-V intact intact  Hip: nml nml    Lower " extremity edema:Negative      Assessment: Chondromalacia of right patella  -     X-Ray Knee Complete 4 Or More Views Right; Future; Expected date: 12/11/2024        Plan:  He is going to continue the Voltaren in his home exercise program.  If his pain persists or worsens then we will consider intra-articular steroid injection or MRI

## 2025-03-03 DIAGNOSIS — K21.9 ACID REFLUX: ICD-10-CM

## 2025-03-03 DIAGNOSIS — R19.8 BORBORYGMI: Primary | ICD-10-CM

## 2025-03-03 DIAGNOSIS — R10.13 ABDOMINAL PAIN, EPIGASTRIC: ICD-10-CM

## 2025-03-07 ENCOUNTER — HOSPITAL ENCOUNTER (EMERGENCY)
Facility: HOSPITAL | Age: 55
Discharge: HOME | End: 2025-03-07
Attending: STUDENT IN AN ORGANIZED HEALTH CARE EDUCATION/TRAINING PROGRAM
Payer: COMMERCIAL

## 2025-03-07 VITALS
SYSTOLIC BLOOD PRESSURE: 139 MMHG | TEMPERATURE: 98 F | DIASTOLIC BLOOD PRESSURE: 89 MMHG | HEART RATE: 70 BPM | HEIGHT: 69 IN | OXYGEN SATURATION: 99 % | RESPIRATION RATE: 16 BRPM | WEIGHT: 191 LBS | BODY MASS INDEX: 28.29 KG/M2

## 2025-03-07 DIAGNOSIS — M79.669 PAIN AND SWELLING OF LOWER LEG: ICD-10-CM

## 2025-03-07 DIAGNOSIS — M79.673 FOOT PAIN: ICD-10-CM

## 2025-03-07 DIAGNOSIS — M77.9 TENDONITIS: Primary | ICD-10-CM

## 2025-03-07 DIAGNOSIS — M79.89 PAIN AND SWELLING OF LOWER LEG: ICD-10-CM

## 2025-03-07 LAB
ALBUMIN SERPL-MCNC: 4 G/DL (ref 3.5–5)
ALBUMIN/GLOB SERPL: 1.3 RATIO (ref 1.1–2)
ALP SERPL-CCNC: 94 UNIT/L (ref 40–150)
ALT SERPL-CCNC: 17 UNIT/L (ref 0–55)
ANION GAP SERPL CALC-SCNC: 8 MEQ/L
AST SERPL-CCNC: 15 UNIT/L (ref 5–34)
BASOPHILS # BLD AUTO: 0.05 X10(3)/MCL
BASOPHILS NFR BLD AUTO: 0.4 %
BILIRUB SERPL-MCNC: 0.7 MG/DL
BUN SERPL-MCNC: 12.3 MG/DL (ref 8.4–25.7)
CALCIUM SERPL-MCNC: 9.4 MG/DL (ref 8.4–10.2)
CHLORIDE SERPL-SCNC: 105 MMOL/L (ref 98–107)
CO2 SERPL-SCNC: 27 MMOL/L (ref 22–29)
CREAT SERPL-MCNC: 1.05 MG/DL (ref 0.72–1.25)
CREAT/UREA NIT SERPL: 12
EOSINOPHIL # BLD AUTO: 0.12 X10(3)/MCL (ref 0–0.9)
EOSINOPHIL NFR BLD AUTO: 1.1 %
ERYTHROCYTE [DISTWIDTH] IN BLOOD BY AUTOMATED COUNT: 11.7 % (ref 11.5–17)
GFR SERPLBLD CREATININE-BSD FMLA CKD-EPI: >60 ML/MIN/1.73/M2
GLOBULIN SER-MCNC: 3.1 GM/DL (ref 2.4–3.5)
GLUCOSE SERPL-MCNC: 103 MG/DL (ref 74–100)
HCT VFR BLD AUTO: 43.1 % (ref 42–52)
HGB BLD-MCNC: 14.6 G/DL (ref 14–18)
IMM GRANULOCYTES # BLD AUTO: 0.04 X10(3)/MCL (ref 0–0.04)
IMM GRANULOCYTES NFR BLD AUTO: 0.4 %
LIPASE SERPL-CCNC: 38 U/L
LYMPHOCYTES # BLD AUTO: 1.58 X10(3)/MCL (ref 0.6–4.6)
LYMPHOCYTES NFR BLD AUTO: 13.9 %
MCH RBC QN AUTO: 31.9 PG (ref 27–31)
MCHC RBC AUTO-ENTMCNC: 33.9 G/DL (ref 33–36)
MCV RBC AUTO: 94.3 FL (ref 80–94)
MONOCYTES # BLD AUTO: 0.81 X10(3)/MCL (ref 0.1–1.3)
MONOCYTES NFR BLD AUTO: 7.1 %
NEUTROPHILS # BLD AUTO: 8.75 X10(3)/MCL (ref 2.1–9.2)
NEUTROPHILS NFR BLD AUTO: 77.1 %
NRBC BLD AUTO-RTO: 0 %
PLATELET # BLD AUTO: 264 X10(3)/MCL (ref 130–400)
PMV BLD AUTO: 9.4 FL (ref 7.4–10.4)
POTASSIUM SERPL-SCNC: 4.4 MMOL/L (ref 3.5–5.1)
PROT SERPL-MCNC: 7.1 GM/DL (ref 6.4–8.3)
RBC # BLD AUTO: 4.57 X10(6)/MCL (ref 4.7–6.1)
SODIUM SERPL-SCNC: 140 MMOL/L (ref 136–145)
WBC # BLD AUTO: 11.35 X10(3)/MCL (ref 4.5–11.5)

## 2025-03-07 PROCEDURE — 83690 ASSAY OF LIPASE: CPT | Performed by: PHYSICIAN ASSISTANT

## 2025-03-07 PROCEDURE — 80053 COMPREHEN METABOLIC PANEL: CPT | Performed by: PHYSICIAN ASSISTANT

## 2025-03-07 PROCEDURE — 99285 EMERGENCY DEPT VISIT HI MDM: CPT | Mod: 25

## 2025-03-07 PROCEDURE — 85025 COMPLETE CBC W/AUTO DIFF WBC: CPT | Performed by: PHYSICIAN ASSISTANT

## 2025-03-07 RX ORDER — HYDROCODONE BITARTRATE AND ACETAMINOPHEN 5; 325 MG/1; MG/1
1 TABLET ORAL EVERY 8 HOURS PRN
Qty: 12 TABLET | Refills: 0 | Status: SHIPPED | OUTPATIENT
Start: 2025-03-07

## 2025-03-07 RX ORDER — LIDOCAINE HYDROCHLORIDE 20 MG/ML
15 SOLUTION OROPHARYNGEAL
Status: DISCONTINUED | OUTPATIENT
Start: 2025-03-07 | End: 2025-03-07

## 2025-03-07 RX ORDER — ALUMINUM HYDROXIDE, MAGNESIUM HYDROXIDE, AND SIMETHICONE 1200; 120; 1200 MG/30ML; MG/30ML; MG/30ML
30 SUSPENSION ORAL
Status: DISCONTINUED | OUTPATIENT
Start: 2025-03-07 | End: 2025-03-07

## 2025-03-07 RX ORDER — HYDROCODONE BITARTRATE AND ACETAMINOPHEN 5; 325 MG/1; MG/1
1 TABLET ORAL EVERY 8 HOURS PRN
Qty: 12 TABLET | Refills: 0 | Status: SHIPPED | OUTPATIENT
Start: 2025-03-07 | End: 2025-03-07

## 2025-03-07 NOTE — Clinical Note
"Chava Bah (David) was seen and treated in our emergency department on 3/7/2025.  He may return to work on 03/10/2025.       If you have any questions or concerns, please don't hesitate to call.      Asha Grande, NP"

## 2025-03-07 NOTE — ED PROVIDER NOTES
Encounter Date: 3/7/2025       History     Chief Complaint   Patient presents with    Ankle Pain     Patient reports right ankle pain, pain with palpitation. No trauma/injury to foot.  Denies any calf pain. No localized swelling or redness. + pulses noted to bilateral DP/PT.      55 y.o. White male with a history of IBS, GERD, and Kidney stones presents to Emergency Department with a chief complaint of atraumatic R foot pain. Symptoms began today and have been constant since onset. Patient reports pain abruptly started. Associated symptoms include none. Symptoms are aggravated with palpation and bearing weight and there are no alleviating factors. Patient sent by PCP, concerned about blood clot. The patient denies CP, SOB, fever, chills, vomiting,  dizziness, or injury/trauma. No other reported symptoms at this time. Patient currently on Levaquin for cough, took 3 doses.       The history is provided by the patient. No  was used.   Illness   The current episode started today. The problem occurs continuously. The problem has been unchanged. Pertinent negatives include no fever, no photophobia, no constipation, no nausea, no vomiting, no stridor, no cough, no shortness of breath, no URI and no wheezing. He has received no recent medical care.     Review of patient's allergies indicates:   Allergen Reactions    Atorvastatin Hives    Methylprednisolone Hives    Penicillins      Past Medical History:   Diagnosis Date    Bone spur neck     Congenital absence of one kidney     E. coli after prostate bx, currently on abx     GERD (gastroesophageal reflux disease)     History of chicken pox     Irregular heart beat     Irritable bowel syndrome without diarrhea     Kidney stones     Nonerosive esophageal reflux disease     Personal history of colonic polyps     Personal history of colonic polyps     Prostatitis     Swallowing difficulty      Past Surgical History:   Procedure Laterality  Date    abdominal ultrasound      COLONOSCOPY  04/20/2022    COLONOSCOPY  03/30/2017    ESOPHAGOGASTRODUODENOSCOPY      2021, 02/25/21, 12/29/17,    ESOPHAGOGASTRODUODENOSCOPY N/A 07/25/2022    Procedure: EGD w/ BRAVO PLACEMENT 48 HR;  Surgeon: SHELTON Schreiber MD;  Location: [facility] ENDOSCOPY;  Service: Gastroenterology;  Laterality: N/A;  Pt advised by office to hold PPI x 3 days before procedure.    HERNIA REPAIR      INSERTION OF PH PROBE N/A 07/25/2022    Procedure: FAM;  Surgeon: SHELTON Schreiber MD;  Location: [facility] ENDOSCOPY;  Service: Gastroenterology;  Laterality: N/A;    LITHOTRIPSY      x2    PARTIAL NEPHRECTOMY      PROSTATE BIOPSY      x2     Family History   Problem Relation Name Age of Onset    No Known Problems Mother      No Known Problems Father       Social History[1]  Review of Systems   Constitutional:  Negative for diaphoresis, fatigue and fever.   Eyes:  Negative for photophobia and visual disturbance.   Respiratory:  Negative for cough, shortness of breath, wheezing and stridor.    Cardiovascular:  Negative for chest pain and leg swelling.   Gastrointestinal:  Negative for constipation, nausea and vomiting.   Musculoskeletal:  Positive for arthralgias. Negative for joint swelling and myalgias.   Skin:  Negative for color change and wound.   All other systems reviewed and are negative.      Physical Exam     Initial Vitals [03/07/25 1443]   BP Pulse Resp Temp SpO2   126/88 79 20 98.8 °F (37.1 °C) 97 %      MAP       --         Physical Exam    Nursing note and vitals reviewed.  Constitutional: He appears well-developed and well-nourished. He is not diaphoretic. He is cooperative.  Non-toxic appearance. No distress.   HENT:   Head: Normocephalic and atraumatic.   Right Ear: External ear normal.   Left Ear: External ear normal.   Nose: Nose normal.   Eyes: Conjunctivae and EOM are normal. Pupils are equal, round, and reactive to light.   Neck: Neck supple.    Normal range of motion.  Cardiovascular:  Normal rate, regular rhythm, S1 normal, S2 normal, normal heart sounds, intact distal pulses and normal pulses.           Pulses:       Dorsalis pedis pulses are 2+ on the right side and 2+ on the left side.        Posterior tibial pulses are 2+ on the right side and 2+ on the left side.   Pulmonary/Chest: Effort normal and breath sounds normal. No tachypnea and no bradypnea. No respiratory distress. He has no decreased breath sounds. He has no wheezes. He has no rhonchi. He has no rales. He exhibits no tenderness.   Abdominal: Abdomen is soft. Bowel sounds are normal. He exhibits no distension. There is no abdominal tenderness. There is no rebound.   Musculoskeletal:         General: Tenderness present. Normal range of motion.      Cervical back: Normal range of motion and neck supple.        Legs:       Comments: Tenderness noted to outlined area. FROM noted. CMS intact. All other adjacent joints otherwise normal. Scant amount of redness and warmth noted to area. No swelling, weakness, or deformities noted.        Neurological: He is alert and oriented to person, place, and time. He has normal strength. No sensory deficit. GCS score is 15. GCS eye subscore is 4. GCS verbal subscore is 5. GCS motor subscore is 6.   Skin: Skin is warm and dry. Capillary refill takes less than 2 seconds. No erythema.   Psychiatric: He has a normal mood and affect. Thought content normal.         ED Course   Procedures  Labs Reviewed   COMPREHENSIVE METABOLIC PANEL - Abnormal       Result Value    Sodium 140      Potassium 4.4      Chloride 105      CO2 27      Glucose 103 (*)     Blood Urea Nitrogen 12.3      Creatinine 1.05      Calcium 9.4      Protein Total 7.1      Albumin 4.0      Globulin 3.1      Albumin/Globulin Ratio 1.3      Bilirubin Total 0.7      ALP 94      ALT 17      AST 15      eGFR >60      Anion Gap 8.0      BUN/Creatinine Ratio 12     CBC WITH DIFFERENTIAL - Abnormal     WBC 11.35      RBC 4.57 (*)     Hgb 14.6      Hct 43.1      MCV 94.3 (*)     MCH 31.9 (*)     MCHC 33.9      RDW 11.7      Platelet 264      MPV 9.4      Neut % 77.1      Lymph % 13.9      Mono % 7.1      Eos % 1.1      Basophil % 0.4      Imm Grans % 0.4      Neut # 8.75      Lymph # 1.58      Mono # 0.81      Eos # 0.12      Baso # 0.05      Imm Gran # 0.04      NRBC% 0.0     LIPASE - Normal    Lipase Level 38     CBC W/ AUTO DIFFERENTIAL    Narrative:     The following orders were created for panel order CBC auto differential.  Procedure                               Abnormality         Status                     ---------                               -----------         ------                     CBC with Differential[[id number]]       Abnormal            Final result                 Please view results for these tests on the individual orders.          Imaging Results              X-Ray Foot Complete Right (Final result)  Result time 03/07/25 17:54:24      Final result by Codey Mcknight MD (03/07/25 17:54:24)                   Impression:      Calcaneal enthesophyte.      Electronically signed by: Codey Mcknight  Date:    03/07/2025  Time:    17:54               Narrative:    EXAMINATION:  XR FOOT COMPLETE 3 VIEW RIGHT    CLINICAL HISTORY:  Pain in unspecified foot    TECHNIQUE:  Three-view    COMPARISON:  None available    FINDINGS:  Articular surfaces alignment is preserved and there is no intrinsic osseous abnormality.  No acute fracture, dislocation or significant arthritic change.  There is moderate-sized calcaneal enthesophyte.                                       Medications - No data to display  Medical Decision Making  Patient awake, alert, has non-labored breathing, and follows commands appropriately. C/o atraumatic R foot pain. Symptoms began today. Afebrile. Denies injury/trauma. NAD Noted.     Judging by the patient's chief complaint and pertinent history, the patient has the following possible  differential diagnoses, including but not limited to the following: Foot Pain, OA, Gout, DVT, Joint Pain    Some of these are deemed to be lower likelihood and some more likely based on my physical exam and history combined with possible lab work and/or imaging studies. Please see the pertinent studies, and refer to the HPI. Some of these diagnoses will take further evaluation to fully rule out, perhaps as an outpatient and the patient was encouraged to follow up when discharged for more comprehensive evaluation.       Amount and/or Complexity of Data Reviewed  Labs: ordered.     Details: No leukocytosis noted. No electrolyte derangement noted.   Radiology: ordered. Decision-making details documented in ED Course.     Details: Informed patient of results.   Discussion of management or test interpretation with external provider(s): Discussed plan of care and interventions with patient. Agreed to and aware of plan of care. Comfortable being discharged home. Patient discharged home. Patient denies new or additional complaints; no further tests indicated at this time. Verbalized understanding of instructions. No emergent or apparent distress noted prior to discharge. Strict ER return precautions given.       Risk  OTC drugs.  Prescription drug management.               ED Course as of 03/07/25 1917   Fri Mar 07, 2025   1803 X-Ray Foot Complete Right  Calcaneal enthesophyte. [JA]   1815 US Tech reports DVT - & US art WNL.  [JA]   1841 Had lengthy discussion with patient regarding results. Workup essentially unremarkable ton today. Patient recently placed on Levaquin, this could be causing symptoms. Common side effect of Levaquin is tendonitis or tendon rupture. Instructed patient to cease admin of antibiotic.     Also patient reports acid reflux symptoms, declined GI cocktail. States he will continue taking Omeprazole and Carafate. Has seen several GI doctors regarding this.  [JA]   1847 US DVT- Negative for deep and  superficial vein thrombosis in the right lower extremity. [JA]   1856 US arterial- The right lower extremity arterial system is patent with no evidence of focal stenosis or occlusion. [JA]   1905 Per UTD if on fluoroquinolones if any signs of tendinopathy develop, we advise patients to discontinue the medication if any sign of tendinopathy develops (ie, pain, swelling). In addition, we generally advise patients to avoid exercise, contact their physician for evaluation, and transition to a non-fluoroquinolone antibiotic when appropriate.    Patient reports XR chest performed after cough developed was clear, only placed on abx to prevent infection. Has no signs of systemic infection, afebrile, in no respiratory distress, and breaths sounds clear. To f/u with PCP. [JA]   1911 At disposition, patient has no additional complaints, ambulatory, has no signs of infection, neurovascularly intact, and non-toxic in appearance. Stable for dc home. Patient wants to avoid NSAIDs; states he has Celebrex at home. Concerned about kidney function.  [JA]      ED Course User Index  [JA] Asha Grande, LYNNE                           Clinical Impression:  Final diagnoses:  [M79.669, M79.89] Pain and swelling of lower leg  [M79.673] Foot pain  [M77.9] Tendonitis (Primary)          ED Disposition Condition    Discharge Stable          ED Prescriptions       Medication Sig Dispense Start Date End Date Auth. Provider    HYDROcodone-acetaminophen (NORCO) 5-325 mg per tablet  (Status: Discontinued) Take 1 tablet by mouth every 8 (eight) hours as needed for Pain. 12 tablet 3/7/2025 3/7/2025 Asha Grande NP    HYDROcodone-acetaminophen (NORCO) 5-325 mg per tablet Take 1 tablet by mouth every 8 (eight) hours as needed for Pain. 12 tablet 3/7/2025 -- Asha Grande, LYNNE          Follow-up Information       Follow up With Specialties Details Why Contact Info    PCP  Schedule an appointment as soon as possible for a visit on 3/10/2025       [facility]  Emergency Dept Emergency Medicine Go to  If symptoms worsen, As needed [address] [facility] [id number] [phone]                 [1]  Social History  Tobacco Use    Smoking status: Former     Current packs/day: 1.00     Average packs/day: 1 pack/day for 10.0 years (10.0 ttl pk-yrs)     Types: Cigarettes    Smokeless tobacco: Never   Substance Use Topics    Alcohol use: Yes     Comment: Occasional    Drug use: Never      Asha Grande, NP  03/07/25 1919

## 2025-03-07 NOTE — FIRST PROVIDER EVALUATION
"Medical screening examination initiated.  I have conducted a focused provider triage encounter, findings are as follows:    Brief history of present illness:  54 yo male presents to ED for evaluation of right ankle pain. Reports to feeling like his veins are distended and hard. Sent by PCP for evaluation of possible blood clot.     Vitals:    03/07/25 1443   BP: 126/88   Pulse: 79   Resp: 20   Temp: 98.8 °F (37.1 °C)   TempSrc: Oral   SpO2: 97%   Weight: 86.6 kg (191 lb)   Height: 5' 9" (1.753 m)       Pertinent physical exam:  Patient is awake and alert and oriented.  Ambulatory to triage.  In no acute distress.      Brief workup plan:  labs, US    Preliminary workup initiated; this workup will be continued and followed by the physician or advanced practice provider that is assigned to the patient when roomed.  "

## 2025-03-08 ENCOUNTER — HOSPITAL ENCOUNTER (EMERGENCY)
Facility: HOSPITAL | Age: 55
Discharge: HOME | End: 2025-03-08
Attending: STUDENT IN AN ORGANIZED HEALTH CARE EDUCATION/TRAINING PROGRAM
Payer: COMMERCIAL

## 2025-03-08 VITALS
DIASTOLIC BLOOD PRESSURE: 90 MMHG | SYSTOLIC BLOOD PRESSURE: 133 MMHG | OXYGEN SATURATION: 96 % | WEIGHT: 191 LBS | RESPIRATION RATE: 16 BRPM | HEIGHT: 69 IN | TEMPERATURE: 99 F | BODY MASS INDEX: 28.29 KG/M2 | HEART RATE: 99 BPM

## 2025-03-08 DIAGNOSIS — M10.9 ACUTE GOUT OF LEFT FOOT, UNSPECIFIED CAUSE: Primary | ICD-10-CM

## 2025-03-08 LAB
ERYTHROCYTE [SEDIMENTATION RATE] IN BLOOD: 38 MM/HR (ref 0–20)
LEFT CFA PSV: 117 CM/S
RIGHT CFA PSV: 94 CM/S
RIGHT DORSALIS PEDIS PSV: 83 CM/S
RIGHT POST TIBIAL SYS PSV: 77 CM/S
RIGHT PROFUNDA SYS PSV: 81 CM/S
RIGHT SUPER FEMORAL DIST SYS PSV: 53 CM/S
RIGHT SUPER FEMORAL MID SYS PSV: 97 CM/S
RIGHT SUPER FEMORAL PROX SYS PSV: 80 CM/S
URATE SERPL-MCNC: 6.5 MG/DL (ref 3.5–7.2)

## 2025-03-08 PROCEDURE — 84550 ASSAY OF BLOOD/URIC ACID: CPT | Performed by: NURSE PRACTITIONER

## 2025-03-08 PROCEDURE — 25000003 PHARM REV CODE 250: Performed by: NURSE PRACTITIONER

## 2025-03-08 PROCEDURE — 85652 RBC SED RATE AUTOMATED: CPT | Performed by: NURSE PRACTITIONER

## 2025-03-08 PROCEDURE — 99283 EMERGENCY DEPT VISIT LOW MDM: CPT

## 2025-03-08 RX ORDER — COLCHICINE 0.6 MG/1
0.6 TABLET, FILM COATED ORAL
Status: COMPLETED | OUTPATIENT
Start: 2025-03-08 | End: 2025-03-08

## 2025-03-08 RX ADMIN — COLCHICINE 0.6 MG: 0.6 CAPSULE ORAL at 11:03

## 2025-03-09 NOTE — ED PROVIDER NOTES
Encounter Date: 3/8/2025       History     Chief Complaint   Patient presents with    Foot Injury     Seen in er yesterday for foot pain, said he fell  asleep in a recliner and woke up with foot pain,yesterday of was checked for blood clot in leg and told test was neg, still having foot pain wants to be checked for gout.       See MDM    The history is provided by the patient. No  was used.     Review of patient's allergies indicates:   Allergen Reactions    Atorvastatin Hives    Methylprednisolone Hives    Penicillins      Past Medical History:   Diagnosis Date    Bone spur neck     Congenital absence of one kidney     E. coli after prostate bx, currently on abx     GERD (gastroesophageal reflux disease)     History of chicken pox     Irregular heart beat     Irritable bowel syndrome without diarrhea     Kidney stones     Nonerosive esophageal reflux disease     Personal history of colonic polyps     Personal history of colonic polyps     Prostatitis     Swallowing difficulty      Past Surgical History:   Procedure Laterality Date    abdominal ultrasound      COLONOSCOPY  04/20/2022    COLONOSCOPY  03/30/2017    ESOPHAGOGASTRODUODENOSCOPY      2021, 02/25/21, 12/29/17,    ESOPHAGOGASTRODUODENOSCOPY N/A 07/25/2022    Procedure: EGD w/ BRAVO PLACEMENT 48 HR;  Surgeon: SHELTON Schreiber MD;  Location: [facility] ENDOSCOPY;  Service: Gastroenterology;  Laterality: N/A;  Pt advised by office to hold PPI x 3 days before procedure.    HERNIA REPAIR      INSERTION OF PH PROBE N/A 07/25/2022    Procedure: FAM;  Surgeon: SHELTON Schreiber MD;  Location: [facility] ENDOSCOPY;  Service: Gastroenterology;  Laterality: N/A;    LITHOTRIPSY      x2    PARTIAL NEPHRECTOMY      PROSTATE BIOPSY      x2     Family History   Problem Relation Name Age of Onset    No Known Problems Mother      No Known Problems Father       Social History[1]  Review of Systems   Constitutional:  Negative for fever.    Respiratory:  Negative for cough and shortness of breath.    Cardiovascular:  Negative for chest pain.   Gastrointestinal:  Negative for abdominal pain.   Genitourinary:  Negative for difficulty urinating and dysuria.   Musculoskeletal:  Negative for gait problem.   Skin:  Negative for color change.   Neurological:  Negative for dizziness, speech difficulty and headaches.   Psychiatric/Behavioral:  Negative for hallucinations and suicidal ideas.    All other systems reviewed and are negative.      Physical Exam     Initial Vitals [03/08/25 2138]   BP Pulse Resp Temp SpO2   (!) 133/90 99 16 98.8 °F (37.1 °C) 96 %      MAP       --         Physical Exam    Nursing note and vitals reviewed.  Constitutional: He appears well-developed and well-nourished.   HENT:   Head: Normocephalic.   Eyes: EOM are normal.   Neck: Neck supple.   Normal range of motion.  Cardiovascular:  Normal rate, regular rhythm, normal heart sounds and intact distal pulses.           Pulmonary/Chest: Breath sounds normal.   Abdominal: Abdomen is soft. Bowel sounds are normal.   Musculoskeletal:         General: Normal range of motion.      Cervical back: Normal range of motion and neck supple.     Neurological: He is alert and oriented to person, place, and time. He has normal strength.   Skin: Skin is warm and dry. Capillary refill takes less than 2 seconds.   Redness to the dorsal surface of the left foot   Psychiatric: He has a normal mood and affect. His behavior is normal. Judgment and thought content normal.         ED Course   Procedures  Labs Reviewed   SEDIMENTATION RATE, AUTOMATED - Abnormal       Result Value    Sed Rate 38 (*)    URIC ACID - Normal    Uric Acid 6.5            Imaging Results    None          Medications   colchicine capsule/tablet 0.6 mg (has no administration in time range)     Medical Decision Making  Historian:  Patient.  Patient is a White 55 y.o. male that presents with redness to the dorsal surface of his left  foot that has been present 2 days. Associated symptoms nothing. Surrounding information is senior yesterday had an ultrasound that showed no DVT and x-ray showed no fracture. Exacerbated by nothing. Relieved by nothing. Patient treatment prior to arrival none. Risk factors include none. Other history pertaining to this complaint nothing.   Assessment:  See physical exam.  DD:  Cellulitis, gout, tendonitis  ED Course: History was obtained.  Physical was performed.  Patient appears to have gout.  He only wanted 1 dose of colchicine.  He is following up with his physician on Monday.  Medical or surgical consults:  None. Social determinants that affect healthcare:  None.       Amount and/or Complexity of Data Reviewed  Labs: ordered.     Details: Mild elevation of sed rate                                      Clinical Impression:  Final diagnoses:  [M10.9] Acute gout of left foot, unspecified cause (Primary)          ED Disposition Condition    Discharge Stable          ED Prescriptions    None       Follow-up Information       Follow up With Specialties Details Why Contact Info    Your Primary Care Provider  Call in 3 days ed follow up                  [1]   Social History  Tobacco Use    Smoking status: Former     Current packs/day: 1.00     Average packs/day: 1 pack/day for 10.0 years (10.0 ttl pk-yrs)     Types: Cigarettes    Smokeless tobacco: Never   Substance Use Topics    Alcohol use: Yes     Comment: Occasional    Drug use: Never        Eze Fleming, REBECCA  03/08/25 4477

## 2025-03-25 ENCOUNTER — HOSPITAL ENCOUNTER (OUTPATIENT)
Dept: RADIOLOGY | Facility: HOSPITAL | Age: 55
Discharge: HOME | End: 2025-03-25
Attending: INTERNAL MEDICINE
Payer: COMMERCIAL

## 2025-03-25 DIAGNOSIS — R19.8 BORBORYGMI: ICD-10-CM

## 2025-03-25 DIAGNOSIS — K21.9 ACID REFLUX: ICD-10-CM

## 2025-03-25 DIAGNOSIS — R10.13 ABDOMINAL PAIN, EPIGASTRIC: ICD-10-CM

## 2025-03-25 PROCEDURE — A9698 NON-RAD CONTRAST MATERIALNOC: HCPCS | Performed by: INTERNAL MEDICINE

## 2025-03-25 PROCEDURE — 25500020 PHARM REV CODE 255: Performed by: INTERNAL MEDICINE

## 2025-03-25 PROCEDURE — 74248 X-RAY SM INT F-THRU STD: CPT | Mod: TC

## 2025-03-25 RX ADMIN — BARIUM SULFATE 300 ML: 980 POWDER, FOR SUSPENSION ORAL at 10:03

## 2025-03-25 RX ADMIN — BARIUM SULFATE 100 ML: 0.6 SUSPENSION ORAL at 10:03

## 2025-05-27 ENCOUNTER — APPOINTMENT (OUTPATIENT)
Dept: URBAN - METROPOLITAN AREA CLINIC 110 | Facility: CLINIC | Age: 55
Setting detail: DERMATOLOGY
End: 2025-05-27

## 2025-05-27 VITALS — HEIGHT: 68 IN | WEIGHT: 230 LBS

## 2025-05-27 DIAGNOSIS — L57.8 OTHER SKIN CHANGES DUE TO CHRONIC EXPOSURE TO NONIONIZING RADIATION: ICD-10-CM

## 2025-05-27 DIAGNOSIS — D18.0 HEMANGIOMA: ICD-10-CM

## 2025-05-27 DIAGNOSIS — L81.4 OTHER MELANIN HYPERPIGMENTATION: ICD-10-CM

## 2025-05-27 DIAGNOSIS — L82.1 OTHER SEBORRHEIC KERATOSIS: ICD-10-CM

## 2025-05-27 PROBLEM — D18.01 HEMANGIOMA OF SKIN AND SUBCUTANEOUS TISSUE: Status: ACTIVE | Noted: 2025-05-27

## 2025-05-27 PROCEDURE — ? COUNSELING

## 2025-05-27 PROCEDURE — 99213 OFFICE O/P EST LOW 20 MIN: CPT

## 2025-05-27 ASSESSMENT — LOCATION SIMPLE DESCRIPTION DERM
LOCATION SIMPLE: RIGHT CALF
LOCATION SIMPLE: UPPER BACK
LOCATION SIMPLE: LEFT THIGH
LOCATION SIMPLE: CHEST
LOCATION SIMPLE: RIGHT UPPER ARM
LOCATION SIMPLE: RIGHT FOREHEAD
LOCATION SIMPLE: RIGHT UPPER BACK
LOCATION SIMPLE: LEFT UPPER ARM
LOCATION SIMPLE: RIGHT FOREARM
LOCATION SIMPLE: LEFT PRETIBIAL REGION
LOCATION SIMPLE: LEFT FOREARM
LOCATION SIMPLE: ABDOMEN
LOCATION SIMPLE: RIGHT THIGH

## 2025-05-27 ASSESSMENT — LOCATION ZONE DERM
LOCATION ZONE: LEG
LOCATION ZONE: FACE
LOCATION ZONE: ARM
LOCATION ZONE: TRUNK

## 2025-05-27 ASSESSMENT — LOCATION DETAILED DESCRIPTION DERM
LOCATION DETAILED: LEFT DISTAL DORSAL FOREARM
LOCATION DETAILED: LEFT VENTRAL DISTAL FOREARM
LOCATION DETAILED: SUPERIOR THORACIC SPINE
LOCATION DETAILED: RIGHT DISTAL DORSAL FOREARM
LOCATION DETAILED: RIGHT DISTAL POSTERIOR UPPER ARM
LOCATION DETAILED: RIGHT MEDIAL UPPER BACK
LOCATION DETAILED: RIGHT MEDIAL FOREHEAD
LOCATION DETAILED: LEFT PROXIMAL POSTERIOR UPPER ARM
LOCATION DETAILED: RIGHT VENTRAL DISTAL FOREARM
LOCATION DETAILED: EPIGASTRIC SKIN
LOCATION DETAILED: STERNUM
LOCATION DETAILED: RIGHT PROXIMAL CALF
LOCATION DETAILED: RIGHT ANTERIOR DISTAL THIGH
LOCATION DETAILED: LEFT ANTERIOR DISTAL THIGH
LOCATION DETAILED: LEFT PROXIMAL PRETIBIAL REGION

## (undated) DEVICE — TIP SUCTION YANKAUER

## (undated) DEVICE — TUBING O2 FEMALE CONN 13FT

## (undated) DEVICE — KIT CANIST SUCTION 1200CC

## (undated) DEVICE — CAPSULE BRAVO PH W/DEL SYS

## (undated) DEVICE — FORCEP ALLIGATOR 2.8MM W/NDL

## (undated) DEVICE — FORCEP BX LG CAP 2.8MMX240CM

## (undated) DEVICE — BAG LABGUARD BIOHAZARD 6X9IN

## (undated) DEVICE — KIT SURGICAL COLON .25 1.1OZ

## (undated) DEVICE — CONTAINER SPECIMEN SCREW 4OZ